# Patient Record
Sex: FEMALE | Race: ASIAN | NOT HISPANIC OR LATINO | Employment: OTHER | URBAN - METROPOLITAN AREA
[De-identification: names, ages, dates, MRNs, and addresses within clinical notes are randomized per-mention and may not be internally consistent; named-entity substitution may affect disease eponyms.]

---

## 2017-03-03 ENCOUNTER — HISTORICAL (OUTPATIENT)
Dept: ADMINISTRATIVE | Facility: HOSPITAL | Age: 82
End: 2017-03-03

## 2017-03-13 ENCOUNTER — HISTORICAL (OUTPATIENT)
Dept: ADMINISTRATIVE | Facility: HOSPITAL | Age: 82
End: 2017-03-13

## 2017-06-08 ENCOUNTER — HISTORICAL (OUTPATIENT)
Dept: ADMINISTRATIVE | Facility: HOSPITAL | Age: 82
End: 2017-06-08

## 2017-09-11 ENCOUNTER — HISTORICAL (OUTPATIENT)
Dept: ADMINISTRATIVE | Facility: HOSPITAL | Age: 82
End: 2017-09-11

## 2017-12-07 ENCOUNTER — HISTORICAL (OUTPATIENT)
Dept: ADMINISTRATIVE | Facility: HOSPITAL | Age: 82
End: 2017-12-07

## 2018-01-30 ENCOUNTER — HISTORICAL (OUTPATIENT)
Dept: ADMINISTRATIVE | Facility: HOSPITAL | Age: 83
End: 2018-01-30

## 2018-03-08 ENCOUNTER — HISTORICAL (OUTPATIENT)
Dept: ADMINISTRATIVE | Facility: HOSPITAL | Age: 83
End: 2018-03-08

## 2018-06-07 ENCOUNTER — HISTORICAL (OUTPATIENT)
Dept: ADMINISTRATIVE | Facility: HOSPITAL | Age: 83
End: 2018-06-07

## 2018-09-06 ENCOUNTER — HISTORICAL (OUTPATIENT)
Dept: ADMINISTRATIVE | Facility: HOSPITAL | Age: 83
End: 2018-09-06

## 2018-12-06 ENCOUNTER — HISTORICAL (OUTPATIENT)
Dept: ADMINISTRATIVE | Facility: HOSPITAL | Age: 83
End: 2018-12-06

## 2019-03-11 ENCOUNTER — HISTORICAL (OUTPATIENT)
Dept: ADMINISTRATIVE | Facility: HOSPITAL | Age: 84
End: 2019-03-11

## 2019-06-06 ENCOUNTER — HISTORICAL (OUTPATIENT)
Dept: ADMINISTRATIVE | Facility: HOSPITAL | Age: 84
End: 2019-06-06

## 2019-09-09 ENCOUNTER — HISTORICAL (OUTPATIENT)
Dept: ADMINISTRATIVE | Facility: HOSPITAL | Age: 84
End: 2019-09-09

## 2019-12-09 ENCOUNTER — HISTORICAL (OUTPATIENT)
Dept: ADMINISTRATIVE | Facility: HOSPITAL | Age: 84
End: 2019-12-09

## 2019-12-09 LAB
ALBUMIN SERPL-MCNC: 3.2 GM/DL (ref 3.4–5)
ALBUMIN/GLOB SERPL: 0.8 {RATIO}
ALP SERPL-CCNC: 69 UNIT/L (ref 45–117)
ALT SERPL-CCNC: 11 UNIT/L (ref 13–56)
AST SERPL-CCNC: 14 UNIT/L (ref 15–37)
BILIRUB SERPL-MCNC: 0.4 MG/DL (ref 0.2–1)
BILIRUBIN DIRECT+TOT PNL SERPL-MCNC: <0.1 MG/DL (ref 0–0.2)
BILIRUBIN DIRECT+TOT PNL SERPL-MCNC: >0.3 MG/DL (ref 0–1)
BUN SERPL-MCNC: 27 MG/DL (ref 7–18)
CALCIUM SERPL-MCNC: 8.1 MG/DL (ref 8.5–10.1)
CHLORIDE SERPL-SCNC: 108 MMOL/L (ref 98–107)
CO2 SERPL-SCNC: 25 MMOL/L (ref 21–32)
CREAT SERPL-MCNC: 0.77 MG/DL (ref 0.55–1.02)
EST. AVERAGE GLUCOSE BLD GHB EST-MCNC: 143 MG/DL
GLOBULIN SER-MCNC: 4 GM/DL (ref 2–4)
GLUCOSE SERPL-MCNC: 99 MG/DL (ref 74–106)
HBA1C MFR BLD: 6.6 % (ref 4.2–6.3)
POTASSIUM SERPL-SCNC: 4.1 MMOL/L (ref 3.5–5.1)
PROT SERPL-MCNC: 6.9 GM/DL (ref 6.4–8.2)
SODIUM SERPL-SCNC: 139 MMOL/L (ref 136–145)
TSH SERPL-ACNC: 3.68 MIU/ML (ref 0.36–3.74)

## 2020-02-29 ENCOUNTER — HISTORICAL (OUTPATIENT)
Dept: ADMINISTRATIVE | Facility: HOSPITAL | Age: 85
End: 2020-02-29

## 2020-03-09 ENCOUNTER — HISTORICAL (OUTPATIENT)
Dept: ADMINISTRATIVE | Facility: HOSPITAL | Age: 85
End: 2020-03-09

## 2020-04-13 ENCOUNTER — HISTORICAL (OUTPATIENT)
Dept: ADMINISTRATIVE | Facility: HOSPITAL | Age: 85
End: 2020-04-13

## 2020-12-14 ENCOUNTER — HISTORICAL (OUTPATIENT)
Dept: ADMINISTRATIVE | Facility: HOSPITAL | Age: 85
End: 2020-12-14

## 2020-12-14 LAB
ALBUMIN SERPL-MCNC: 3.3 GM/DL (ref 3.4–4.8)
ALBUMIN/GLOB SERPL: 1.1 RATIO (ref 1.1–2)
ALP SERPL-CCNC: 63 UNIT/L (ref 40–150)
ALT SERPL-CCNC: 12 UNIT/L (ref 0–55)
AST SERPL-CCNC: 16 UNIT/L (ref 5–34)
BILIRUB SERPL-MCNC: 0.4 MG/DL (ref 0.2–1.2)
BILIRUBIN DIRECT+TOT PNL SERPL-MCNC: 0.1 MG/DL (ref 0–0.5)
BILIRUBIN DIRECT+TOT PNL SERPL-MCNC: 0.3 MG/DL (ref 0–0.8)
BUN SERPL-MCNC: 20.2 MG/DL (ref 9.8–20.1)
CALCIUM SERPL-MCNC: 8.4 MG/DL (ref 8.4–10.2)
CHLORIDE SERPL-SCNC: 108 MMOL/L (ref 98–107)
CO2 SERPL-SCNC: 26 MMOL/L (ref 23–31)
CREAT SERPL-MCNC: 0.78 MG/DL (ref 0.57–1.11)
EST. AVERAGE GLUCOSE BLD GHB EST-MCNC: 154.2 MG/DL
GLOBULIN SER-MCNC: 3.1 GM/DL (ref 2.4–3.5)
GLUCOSE SERPL-MCNC: 83 MG/DL (ref 82–115)
HBA1C MFR BLD: 7 %
POTASSIUM SERPL-SCNC: 4 MMOL/L (ref 3.5–5.1)
PROT SERPL-MCNC: 6.4 GM/DL (ref 5.8–7.6)
SODIUM SERPL-SCNC: 141 MMOL/L (ref 136–145)
TSH SERPL-ACNC: 2.76 UIU/ML (ref 0.35–4.94)

## 2021-03-01 ENCOUNTER — HISTORICAL (OUTPATIENT)
Dept: ADMINISTRATIVE | Facility: HOSPITAL | Age: 86
End: 2021-03-01

## 2021-03-01 LAB
EST. AVERAGE GLUCOSE BLD GHB EST-MCNC: 145.6 MG/DL
HBA1C MFR BLD: 6.7 %

## 2021-04-20 ENCOUNTER — HISTORICAL (OUTPATIENT)
Dept: ADMINISTRATIVE | Facility: HOSPITAL | Age: 86
End: 2021-04-20

## 2021-04-20 LAB
APPEARANCE, UA: ABNORMAL
BILIRUB UR QL STRIP: NEGATIVE
COLOR UR: ABNORMAL
GLUCOSE (UA): ABNORMAL
HGB UR QL STRIP: NEGATIVE
KETONES UR QL STRIP: NEGATIVE
LEUKOCYTE ESTERASE UR QL STRIP: NEGATIVE
NITRITE UR QL STRIP: NEGATIVE
PH UR STRIP: 5 [PH] (ref 5–7)
PROT UR QL STRIP: NEGATIVE
SP GR UR STRIP: >=1.03 (ref 1–1.03)
UROBILINOGEN UR STRIP-ACNC: NEGATIVE

## 2021-06-07 ENCOUNTER — HISTORICAL (OUTPATIENT)
Dept: ADMINISTRATIVE | Facility: HOSPITAL | Age: 86
End: 2021-06-07

## 2021-06-07 LAB
ALBUMIN SERPL-MCNC: 3.1 GM/DL (ref 3.4–4.8)
ALBUMIN/GLOB SERPL: 1.1 RATIO (ref 1.1–2)
ALP SERPL-CCNC: 58 UNIT/L (ref 40–150)
ALT SERPL-CCNC: 11 UNIT/L (ref 0–55)
AST SERPL-CCNC: 15 UNIT/L (ref 5–34)
BILIRUB SERPL-MCNC: 0.5 MG/DL (ref 0.2–1.2)
BILIRUBIN DIRECT+TOT PNL SERPL-MCNC: 0.2 MG/DL (ref 0–0.5)
BILIRUBIN DIRECT+TOT PNL SERPL-MCNC: 0.3 MG/DL (ref 0–0.8)
BUN SERPL-MCNC: 21.4 MG/DL (ref 9.8–20.1)
CALCIUM SERPL-MCNC: 8.1 MG/DL (ref 8.4–10.2)
CHLORIDE SERPL-SCNC: 109 MMOL/L (ref 98–107)
CO2 SERPL-SCNC: 23 MMOL/L (ref 23–31)
CREAT SERPL-MCNC: 0.71 MG/DL (ref 0.57–1.11)
EST. AVERAGE GLUCOSE BLD GHB EST-MCNC: 148.5 MG/DL
GLOBULIN SER-MCNC: 2.9 GM/DL (ref 2.4–3.5)
GLUCOSE SERPL-MCNC: 71 MG/DL (ref 82–115)
HBA1C MFR BLD: 6.8 %
POTASSIUM SERPL-SCNC: 3.6 MMOL/L (ref 3.5–5.1)
PROT SERPL-MCNC: 6 GM/DL (ref 5.8–7.6)
SODIUM SERPL-SCNC: 142 MMOL/L (ref 136–145)

## 2021-09-07 ENCOUNTER — HISTORICAL (OUTPATIENT)
Dept: ADMINISTRATIVE | Facility: HOSPITAL | Age: 86
End: 2021-09-07

## 2021-09-07 LAB
EST. AVERAGE GLUCOSE BLD GHB EST-MCNC: 159.9 MG/DL
HBA1C MFR BLD: 7.2 %

## 2022-03-07 ENCOUNTER — HISTORICAL (OUTPATIENT)
Dept: ADMINISTRATIVE | Facility: HOSPITAL | Age: 87
End: 2022-03-07

## 2022-03-07 LAB
EST. AVERAGE GLUCOSE BLD GHB EST-MCNC: 134.1 MG/DL
HBA1C MFR BLD: 6.3 %

## 2022-04-07 ENCOUNTER — HISTORICAL (OUTPATIENT)
Dept: ADMINISTRATIVE | Facility: HOSPITAL | Age: 87
End: 2022-04-07

## 2022-04-07 LAB
APPEARANCE, UA: NORMAL
BACTERIA SPEC CULT: NORMAL
BILIRUB UR QL STRIP.AUTO: NEGATIVE
BILIRUB UR QL STRIP: NEGATIVE
COLOR UR: YELLOW
DO MICRO?: YES
GLUCOSE (UA): NEGATIVE
GLUCOSE UR QL STRIP.AUTO: NEGATIVE
HGB UR QL STRIP: NEGATIVE
KETONES UR QL STRIP.AUTO: NEGATIVE
KETONES UR QL STRIP: NEGATIVE
LEUKOCYTE ESTERASE UR QL STRIP.AUTO: NEGATIVE
LEUKOCYTE ESTERASE UR QL STRIP: NEGATIVE
NITRITE UR QL STRIP: NEGATIVE
PH UR STRIP: 6 [PH] (ref 5–7)
PROT UR QL STRIP.AUTO: NORMAL
PROT UR QL STRIP: NORMAL
RBC #/AREA URNS HPF: 0 /[HPF] (ref 2–5)
RBC UR QL AUTO: NEGATIVE
SP GR UR STRIP: >=1.03 (ref 1–1.03)
SQUAMOUS EPITHELIAL, UA: NORMAL
UROBILINOGEN UR STRIP-ACNC: 0.2
UROBILINOGEN UR STRIP-ACNC: NEGATIVE
WBC #/AREA URNS HPF: NORMAL /[HPF] (ref 2–5)

## 2022-06-06 ENCOUNTER — LAB REQUISITION (OUTPATIENT)
Dept: LAB | Facility: HOSPITAL | Age: 87
End: 2022-06-06
Payer: MEDICARE

## 2022-06-06 DIAGNOSIS — I10 ESSENTIAL (PRIMARY) HYPERTENSION: ICD-10-CM

## 2022-06-06 DIAGNOSIS — E11.9 TYPE 2 DIABETES MELLITUS WITHOUT COMPLICATIONS: ICD-10-CM

## 2022-06-06 LAB
ALBUMIN SERPL-MCNC: 3.7 GM/DL (ref 3.4–4.8)
ALBUMIN/GLOB SERPL: 1.2 RATIO (ref 1.1–2)
ALP SERPL-CCNC: 51 UNIT/L (ref 40–150)
ALT SERPL-CCNC: 13 UNIT/L (ref 0–55)
AST SERPL-CCNC: 17 UNIT/L (ref 5–34)
BILIRUBIN DIRECT+TOT PNL SERPL-MCNC: 0.5 MG/DL
BUN SERPL-MCNC: 26.8 MG/DL (ref 9.8–20.1)
CALCIUM SERPL-MCNC: 8.8 MG/DL (ref 8.4–10.2)
CHLORIDE SERPL-SCNC: 109 MMOL/L (ref 98–111)
CO2 SERPL-SCNC: 24 MMOL/L (ref 23–31)
CREAT SERPL-MCNC: 0.68 MG/DL (ref 0.55–1.02)
EST. AVERAGE GLUCOSE BLD GHB EST-MCNC: 134.1 MG/DL
GLOBULIN SER-MCNC: 3.1 GM/DL (ref 2.4–3.5)
GLUCOSE SERPL-MCNC: 13 MG/DL (ref 75–121)
HBA1C MFR BLD: 6.3 %
POTASSIUM SERPL-SCNC: 3.7 MMOL/L (ref 3.5–5.1)
PROT SERPL-MCNC: 6.8 GM/DL (ref 5.8–7.6)
SODIUM SERPL-SCNC: 145 MMOL/L (ref 132–146)

## 2022-06-06 PROCEDURE — 84075 ASSAY ALKALINE PHOSPHATASE: CPT | Performed by: FAMILY MEDICINE

## 2022-06-06 PROCEDURE — 80053 COMPREHEN METABOLIC PANEL: CPT | Performed by: FAMILY MEDICINE

## 2022-06-06 PROCEDURE — 83036 HEMOGLOBIN GLYCOSYLATED A1C: CPT | Performed by: FAMILY MEDICINE

## 2022-06-14 ENCOUNTER — LAB REQUISITION (OUTPATIENT)
Dept: LAB | Facility: HOSPITAL | Age: 87
End: 2022-06-14
Payer: MEDICARE

## 2022-06-14 DIAGNOSIS — N39.0 URINARY TRACT INFECTION, SITE NOT SPECIFIED: ICD-10-CM

## 2022-06-14 LAB
APPEARANCE UR: ABNORMAL
BILIRUB UR QL STRIP.AUTO: NEGATIVE MG/DL
COLOR UR AUTO: YELLOW
GLUCOSE UR QL STRIP.AUTO: 250 MG/DL
KETONES UR QL STRIP.AUTO: ABNORMAL MG/DL
LEUKOCYTE ESTERASE UR QL STRIP.AUTO: NEGATIVE UNIT/L
NITRITE UR QL STRIP.AUTO: NEGATIVE
PH UR STRIP.AUTO: 5 [PH]
PROT UR QL STRIP.AUTO: NEGATIVE MG/DL
RBC UR QL AUTO: NEGATIVE UNIT/L
SP GR UR STRIP.AUTO: >=1.03
UROBILINOGEN UR STRIP-ACNC: 0.2 MG/DL

## 2022-06-14 PROCEDURE — 87088 URINE BACTERIA CULTURE: CPT | Performed by: FAMILY MEDICINE

## 2022-06-14 PROCEDURE — 81003 URINALYSIS AUTO W/O SCOPE: CPT | Performed by: FAMILY MEDICINE

## 2022-06-16 LAB — BACTERIA UR CULT: NO GROWTH

## 2022-09-01 ENCOUNTER — LAB REQUISITION (OUTPATIENT)
Dept: LAB | Facility: HOSPITAL | Age: 87
End: 2022-09-01
Payer: MEDICARE

## 2022-09-01 DIAGNOSIS — E11.9 TYPE 2 DIABETES MELLITUS WITHOUT COMPLICATIONS: ICD-10-CM

## 2022-09-01 LAB
EST. AVERAGE GLUCOSE BLD GHB EST-MCNC: 151.3 MG/DL
HBA1C MFR BLD: 6.9 %

## 2022-09-01 PROCEDURE — 83036 HEMOGLOBIN GLYCOSYLATED A1C: CPT | Performed by: FAMILY MEDICINE

## 2022-12-01 ENCOUNTER — LAB REQUISITION (OUTPATIENT)
Dept: LAB | Facility: HOSPITAL | Age: 87
End: 2022-12-01
Payer: MEDICARE

## 2022-12-01 DIAGNOSIS — E11.9 TYPE 2 DIABETES MELLITUS WITHOUT COMPLICATIONS: ICD-10-CM

## 2022-12-01 DIAGNOSIS — E78.5 HYPERLIPIDEMIA, UNSPECIFIED: ICD-10-CM

## 2022-12-01 LAB
ALBUMIN SERPL-MCNC: 3.3 GM/DL (ref 3.4–4.8)
ALBUMIN/GLOB SERPL: 1.1 RATIO (ref 1.1–2)
ALP SERPL-CCNC: 64 UNIT/L (ref 40–150)
ALT SERPL-CCNC: 10 UNIT/L (ref 0–55)
AST SERPL-CCNC: 15 UNIT/L (ref 5–34)
BASOPHILS # BLD AUTO: 0.03 X10(3)/MCL (ref 0–0.2)
BASOPHILS NFR BLD AUTO: 0.6 %
BILIRUBIN DIRECT+TOT PNL SERPL-MCNC: 0.4 MG/DL
BUN SERPL-MCNC: 24.4 MG/DL (ref 9.8–20.1)
CALCIUM SERPL-MCNC: 8.7 MG/DL (ref 8.4–10.2)
CHLORIDE SERPL-SCNC: 109 MMOL/L (ref 98–111)
CO2 SERPL-SCNC: 28 MMOL/L (ref 23–31)
CREAT SERPL-MCNC: 0.71 MG/DL (ref 0.55–1.02)
EOSINOPHIL # BLD AUTO: 0.11 X10(3)/MCL (ref 0–0.9)
EOSINOPHIL NFR BLD AUTO: 2 %
ERYTHROCYTE [DISTWIDTH] IN BLOOD BY AUTOMATED COUNT: 12.7 % (ref 11.5–17)
EST. AVERAGE GLUCOSE BLD GHB EST-MCNC: 128.4 MG/DL
GFR SERPLBLD CREATININE-BSD FMLA CKD-EPI: >60 MLS/MIN/1.73/M2
GLOBULIN SER-MCNC: 3 GM/DL (ref 2.4–3.5)
GLUCOSE SERPL-MCNC: 102 MG/DL (ref 75–121)
HBA1C MFR BLD: 6.1 %
HCT VFR BLD AUTO: 39.7 % (ref 37–47)
HGB BLD-MCNC: 12.5 GM/DL (ref 12–16)
IMM GRANULOCYTES # BLD AUTO: 0.01 X10(3)/MCL (ref 0–0.04)
IMM GRANULOCYTES NFR BLD AUTO: 0.2 %
LYMPHOCYTES # BLD AUTO: 2.46 X10(3)/MCL (ref 0.6–4.6)
LYMPHOCYTES NFR BLD AUTO: 45.5 %
MCH RBC QN AUTO: 31.4 PG (ref 27–31)
MCHC RBC AUTO-ENTMCNC: 31.5 MG/DL (ref 33–36)
MCV RBC AUTO: 99.7 FL (ref 80–94)
MONOCYTES # BLD AUTO: 0.51 X10(3)/MCL (ref 0.1–1.3)
MONOCYTES NFR BLD AUTO: 9.4 %
NEUTROPHILS # BLD AUTO: 2.3 X10(3)/MCL (ref 2.1–9.2)
NEUTROPHILS NFR BLD AUTO: 42.3 %
NRBC BLD AUTO-RTO: 0 %
PLATELET # BLD AUTO: 215 X10(3)/MCL (ref 130–400)
PMV BLD AUTO: 10.2 FL (ref 7.4–10.4)
POTASSIUM SERPL-SCNC: 4.2 MMOL/L (ref 3.5–5.1)
PROT SERPL-MCNC: 6.3 GM/DL (ref 5.8–7.6)
RBC # BLD AUTO: 3.98 X10(6)/MCL (ref 4.2–5.4)
SODIUM SERPL-SCNC: 142 MMOL/L (ref 132–146)
TSH SERPL-ACNC: 5.02 UIU/ML (ref 0.35–4.94)
WBC # SPEC AUTO: 5.4 X10(3)/MCL (ref 4.5–11.5)

## 2022-12-01 PROCEDURE — 83036 HEMOGLOBIN GLYCOSYLATED A1C: CPT | Performed by: FAMILY MEDICINE

## 2022-12-01 PROCEDURE — 84443 ASSAY THYROID STIM HORMONE: CPT | Performed by: FAMILY MEDICINE

## 2022-12-01 PROCEDURE — 80053 COMPREHEN METABOLIC PANEL: CPT | Performed by: FAMILY MEDICINE

## 2022-12-01 PROCEDURE — 85025 COMPLETE CBC W/AUTO DIFF WBC: CPT | Performed by: FAMILY MEDICINE

## 2022-12-22 ENCOUNTER — LAB REQUISITION (OUTPATIENT)
Dept: LAB | Facility: HOSPITAL | Age: 87
End: 2022-12-22
Payer: MEDICARE

## 2022-12-22 DIAGNOSIS — N39.0 URINARY TRACT INFECTION, SITE NOT SPECIFIED: ICD-10-CM

## 2022-12-22 DIAGNOSIS — I10 ESSENTIAL (PRIMARY) HYPERTENSION: ICD-10-CM

## 2022-12-22 DIAGNOSIS — R77.0 ABNORMALITY OF ALBUMIN: ICD-10-CM

## 2022-12-22 DIAGNOSIS — R94.6 ABNORMAL RESULTS OF THYROID FUNCTION STUDIES: ICD-10-CM

## 2022-12-22 LAB
ALBUMIN SERPL-MCNC: 3.3 G/DL (ref 3.4–4.8)
ALBUMIN/GLOB SERPL: 0.9 RATIO (ref 1.1–2)
ALP SERPL-CCNC: 59 UNIT/L (ref 40–150)
ALT SERPL-CCNC: 6 UNIT/L (ref 0–55)
AMORPH PHOS CRY URNS QL MICRO: ABNORMAL /HPF
APPEARANCE UR: ABNORMAL
AST SERPL-CCNC: 14 UNIT/L (ref 5–34)
BACTERIA #/AREA URNS AUTO: ABNORMAL /HPF
BASOPHILS # BLD AUTO: 0.04 X10(3)/MCL (ref 0–0.2)
BASOPHILS NFR BLD AUTO: 0.5 %
BILIRUB UR QL STRIP.AUTO: NEGATIVE MG/DL
BILIRUBIN DIRECT+TOT PNL SERPL-MCNC: 0.6 MG/DL
BUN SERPL-MCNC: 45.1 MG/DL (ref 9.8–20.1)
CALCIUM SERPL-MCNC: 9.1 MG/DL (ref 8.4–10.2)
CHLORIDE SERPL-SCNC: 109 MMOL/L (ref 98–111)
CO2 SERPL-SCNC: 27 MMOL/L (ref 23–31)
COLOR UR AUTO: YELLOW
CREAT SERPL-MCNC: 0.8 MG/DL (ref 0.55–1.02)
EOSINOPHIL # BLD AUTO: 0.1 X10(3)/MCL (ref 0–0.9)
EOSINOPHIL NFR BLD AUTO: 1.3 %
ERYTHROCYTE [DISTWIDTH] IN BLOOD BY AUTOMATED COUNT: 12.7 % (ref 11–14.5)
GFR SERPLBLD CREATININE-BSD FMLA CKD-EPI: >60 MLS/MIN/1.73/M2
GLOBULIN SER-MCNC: 3.8 GM/DL (ref 2.4–3.5)
GLUCOSE SERPL-MCNC: 146 MG/DL (ref 75–121)
GLUCOSE UR QL STRIP.AUTO: 100 MG/DL
HCT VFR BLD AUTO: 42.7 % (ref 37–47)
HGB BLD-MCNC: 13.2 GM/DL (ref 12–16)
IMM GRANULOCYTES # BLD AUTO: 0.03 X10(3)/MCL (ref 0–0.04)
IMM GRANULOCYTES NFR BLD AUTO: 0.4 %
KETONES UR QL STRIP.AUTO: ABNORMAL MG/DL
LEUKOCYTE ESTERASE UR QL STRIP.AUTO: ABNORMAL UNIT/L
LYMPHOCYTES # BLD AUTO: 2.82 X10(3)/MCL (ref 0.6–4.6)
LYMPHOCYTES NFR BLD AUTO: 36.9 %
MCH RBC QN AUTO: 31 PG
MCHC RBC AUTO-ENTMCNC: 30.9 MG/DL (ref 33–36)
MCV RBC AUTO: 100.2 FL (ref 80–94)
MONOCYTES # BLD AUTO: 0.43 X10(3)/MCL (ref 0.1–1.3)
MONOCYTES NFR BLD AUTO: 5.6 %
NEUTROPHILS # BLD AUTO: 4.22 X10(3)/MCL (ref 2.1–9.2)
NEUTROPHILS NFR BLD AUTO: 55.3 %
NITRITE UR QL STRIP.AUTO: NEGATIVE
NRBC BLD AUTO-RTO: 0 % (ref 0–1)
PH UR STRIP.AUTO: 8 [PH]
PLATELET # BLD AUTO: 300 X10(3)/MCL (ref 140–371)
PMV BLD AUTO: 9.6 FL (ref 9.4–12.4)
POTASSIUM SERPL-SCNC: 4 MMOL/L (ref 3.5–5.1)
PREALB SERPL-MCNC: 17 MG/DL (ref 14–37)
PROT SERPL-MCNC: 7.1 GM/DL (ref 5.8–7.6)
PROT UR QL STRIP.AUTO: >=300 MG/DL
RBC # BLD AUTO: 4.26 X10(6)/MCL (ref 4.2–5.4)
RBC #/AREA URNS AUTO: ABNORMAL /HPF
RBC UR QL AUTO: ABNORMAL UNIT/L
SODIUM SERPL-SCNC: 146 MMOL/L (ref 132–146)
SP GR UR STRIP.AUTO: 1.02
SQUAMOUS #/AREA URNS AUTO: ABNORMAL /HPF
T4 FREE SERPL-MCNC: 0.91 NG/DL (ref 0.7–1.48)
TRI-PHOS CRY URNS QL MICRO: ABNORMAL /HPF
TSH SERPL-ACNC: 2.96 UIU/ML (ref 0.35–4.94)
UROBILINOGEN UR STRIP-ACNC: 1 MG/DL
WBC # SPEC AUTO: 7.6 X10(3)/MCL (ref 4.5–11.5)
WBC #/AREA URNS AUTO: >100 /HPF

## 2022-12-22 PROCEDURE — 81003 URINALYSIS AUTO W/O SCOPE: CPT | Performed by: FAMILY MEDICINE

## 2022-12-22 PROCEDURE — 80053 COMPREHEN METABOLIC PANEL: CPT | Performed by: FAMILY MEDICINE

## 2022-12-22 PROCEDURE — 84134 ASSAY OF PREALBUMIN: CPT | Performed by: FAMILY MEDICINE

## 2022-12-22 PROCEDURE — 84443 ASSAY THYROID STIM HORMONE: CPT | Performed by: FAMILY MEDICINE

## 2022-12-22 PROCEDURE — 84439 ASSAY OF FREE THYROXINE: CPT | Performed by: FAMILY MEDICINE

## 2022-12-22 PROCEDURE — 87186 SC STD MICRODIL/AGAR DIL: CPT | Performed by: FAMILY MEDICINE

## 2022-12-22 PROCEDURE — 87088 URINE BACTERIA CULTURE: CPT | Performed by: FAMILY MEDICINE

## 2022-12-22 PROCEDURE — 85025 COMPLETE CBC W/AUTO DIFF WBC: CPT | Performed by: FAMILY MEDICINE

## 2022-12-24 LAB — BACTERIA UR CULT: ABNORMAL

## 2022-12-27 ENCOUNTER — HOSPITAL ENCOUNTER (INPATIENT)
Facility: HOSPITAL | Age: 87
LOS: 8 days | DRG: 689 | End: 2023-01-04
Attending: EMERGENCY MEDICINE | Admitting: INTERNAL MEDICINE
Payer: MEDICARE

## 2022-12-27 DIAGNOSIS — R07.9 CHEST PAIN: ICD-10-CM

## 2022-12-27 DIAGNOSIS — R41.82 ALTERED MENTAL STATE: ICD-10-CM

## 2022-12-27 DIAGNOSIS — G93.40 ACUTE ENCEPHALOPATHY: Primary | ICD-10-CM

## 2022-12-27 DIAGNOSIS — Z78.9 FAILURE OF OUTPATIENT TREATMENT: ICD-10-CM

## 2022-12-27 DIAGNOSIS — N39.0 ACUTE UTI: ICD-10-CM

## 2022-12-27 LAB
ALBUMIN SERPL-MCNC: 3.4 G/DL (ref 3.4–4.8)
ALBUMIN/GLOB SERPL: 0.7 RATIO (ref 1.1–2)
ALP SERPL-CCNC: 81 UNIT/L (ref 40–150)
ALT SERPL-CCNC: <5 UNIT/L (ref 0–55)
APPEARANCE UR: ABNORMAL
AST SERPL-CCNC: 19 UNIT/L (ref 5–34)
BACTERIA #/AREA URNS AUTO: ABNORMAL /HPF
BASOPHILS # BLD AUTO: 0.03 X10(3)/MCL (ref 0–0.2)
BASOPHILS NFR BLD AUTO: 0.4 %
BILIRUB UR QL STRIP.AUTO: NEGATIVE MG/DL
BILIRUBIN DIRECT+TOT PNL SERPL-MCNC: 0.5 MG/DL
BUN SERPL-MCNC: 33.2 MG/DL (ref 9.8–20.1)
CALCIUM SERPL-MCNC: 9.5 MG/DL (ref 8.4–10.2)
CHLORIDE SERPL-SCNC: 115 MMOL/L (ref 98–111)
CO2 SERPL-SCNC: 25 MMOL/L (ref 23–31)
COLOR UR AUTO: YELLOW
CREAT SERPL-MCNC: 0.8 MG/DL (ref 0.55–1.02)
EOSINOPHIL # BLD AUTO: 0.12 X10(3)/MCL (ref 0–0.9)
EOSINOPHIL NFR BLD AUTO: 1.5 %
ERYTHROCYTE [DISTWIDTH] IN BLOOD BY AUTOMATED COUNT: 12.8 % (ref 11–14.5)
FLUAV AG UPPER RESP QL IA.RAPID: NOT DETECTED
FLUBV AG UPPER RESP QL IA.RAPID: NOT DETECTED
GFR SERPLBLD CREATININE-BSD FMLA CKD-EPI: >60 MLS/MIN/1.73/M2
GLOBULIN SER-MCNC: 4.7 GM/DL (ref 2.4–3.5)
GLUCOSE SERPL-MCNC: 129 MG/DL (ref 75–121)
GLUCOSE UR QL STRIP.AUTO: ABNORMAL MG/DL
HCT VFR BLD AUTO: 43.7 % (ref 37–47)
HGB BLD-MCNC: 13.6 GM/DL (ref 12–16)
IMM GRANULOCYTES # BLD AUTO: 0.02 X10(3)/MCL (ref 0–0.04)
IMM GRANULOCYTES NFR BLD AUTO: 0.3 %
KETONES UR QL STRIP.AUTO: NEGATIVE MG/DL
LACTATE SERPL-SCNC: 3.3 MMOL/L (ref 0.5–2.2)
LEUKOCYTE ESTERASE UR QL STRIP.AUTO: ABNORMAL UNIT/L
LYMPHOCYTES # BLD AUTO: 2.75 X10(3)/MCL (ref 0.6–4.6)
LYMPHOCYTES NFR BLD AUTO: 34.5 %
MAGNESIUM SERPL-MCNC: 2.6 MG/DL (ref 1.6–2.6)
MCH RBC QN AUTO: 30.9 PG
MCHC RBC AUTO-ENTMCNC: 31.1 MG/DL (ref 33–36)
MCV RBC AUTO: 99.3 FL (ref 80–94)
MONOCYTES # BLD AUTO: 0.52 X10(3)/MCL (ref 0.1–1.3)
MONOCYTES NFR BLD AUTO: 6.5 %
NEUTROPHILS # BLD AUTO: 4.54 X10(3)/MCL (ref 2.1–9.2)
NEUTROPHILS NFR BLD AUTO: 56.8 %
NITRITE UR QL STRIP.AUTO: NEGATIVE
NRBC BLD AUTO-RTO: 0 % (ref 0–1)
PH UR STRIP.AUTO: 6 [PH]
PLATELET # BLD AUTO: 274 X10(3)/MCL (ref 140–371)
PMV BLD AUTO: 9.6 FL (ref 9.4–12.4)
POTASSIUM SERPL-SCNC: 4.3 MMOL/L (ref 3.5–5.1)
PROT SERPL-MCNC: 8.1 GM/DL (ref 5.8–7.6)
PROT UR QL STRIP.AUTO: ABNORMAL MG/DL
RBC # BLD AUTO: 4.4 X10(6)/MCL (ref 4.2–5.4)
RBC #/AREA URNS AUTO: ABNORMAL /HPF
RBC UR QL AUTO: ABNORMAL UNIT/L
SARS-COV-2 RNA RESP QL NAA+PROBE: NOT DETECTED
SODIUM SERPL-SCNC: 149 MMOL/L (ref 132–146)
SP GR UR STRIP.AUTO: 1.03 (ref 1–1.03)
SQUAMOUS #/AREA URNS AUTO: ABNORMAL /HPF
UROBILINOGEN UR STRIP-ACNC: 1 MG/DL
WBC # SPEC AUTO: 8 X10(3)/MCL (ref 4.5–11.5)
WBC #/AREA URNS AUTO: >100 /HPF

## 2022-12-27 PROCEDURE — 96361 HYDRATE IV INFUSION ADD-ON: CPT

## 2022-12-27 PROCEDURE — 80053 COMPREHEN METABOLIC PANEL: CPT | Performed by: NURSE PRACTITIONER

## 2022-12-27 PROCEDURE — 87040 BLOOD CULTURE FOR BACTERIA: CPT | Performed by: NURSE PRACTITIONER

## 2022-12-27 PROCEDURE — 25000003 PHARM REV CODE 250: Performed by: EMERGENCY MEDICINE

## 2022-12-27 PROCEDURE — 11000001 HC ACUTE MED/SURG PRIVATE ROOM

## 2022-12-27 PROCEDURE — 0240U COVID/FLU A&B PCR: CPT | Performed by: NURSE PRACTITIONER

## 2022-12-27 PROCEDURE — 93010 EKG 12-LEAD: ICD-10-PCS | Mod: ,,, | Performed by: INTERNAL MEDICINE

## 2022-12-27 PROCEDURE — 96374 THER/PROPH/DIAG INJ IV PUSH: CPT

## 2022-12-27 PROCEDURE — 81003 URINALYSIS AUTO W/O SCOPE: CPT | Performed by: NURSE PRACTITIONER

## 2022-12-27 PROCEDURE — 87077 CULTURE AEROBIC IDENTIFY: CPT | Performed by: NURSE PRACTITIONER

## 2022-12-27 PROCEDURE — 63600175 PHARM REV CODE 636 W HCPCS: Performed by: EMERGENCY MEDICINE

## 2022-12-27 PROCEDURE — 83735 ASSAY OF MAGNESIUM: CPT | Performed by: NURSE PRACTITIONER

## 2022-12-27 PROCEDURE — 83605 ASSAY OF LACTIC ACID: CPT | Performed by: NURSE PRACTITIONER

## 2022-12-27 PROCEDURE — 85025 COMPLETE CBC W/AUTO DIFF WBC: CPT | Performed by: NURSE PRACTITIONER

## 2022-12-27 PROCEDURE — 93005 ELECTROCARDIOGRAM TRACING: CPT

## 2022-12-27 PROCEDURE — 93010 ELECTROCARDIOGRAM REPORT: CPT | Mod: ,,, | Performed by: INTERNAL MEDICINE

## 2022-12-27 PROCEDURE — 99285 EMERGENCY DEPT VISIT HI MDM: CPT | Mod: 25

## 2022-12-27 RX ADMIN — SODIUM CHLORIDE 1000 ML: 9 INJECTION, SOLUTION INTRAVENOUS at 09:12

## 2022-12-27 RX ADMIN — PIPERACILLIN SODIUM,TAZOBACTAM SODIUM 4.5 G: 4; .5 INJECTION, POWDER, FOR SOLUTION INTRAVENOUS at 11:12

## 2022-12-27 NOTE — Clinical Note
Diagnosis: Acute encephalopathy [851175]   Admitting Provider:: ANDREAS ERVIN [[id number]]   Future Attending Provider: ANDREAS ERVIN [[id number]]   Reason for IP Medical Treatment  (Clinical interventions that can only be accomplished in the IP setting? ) :: see diagnosis   Estimated Length of Stay:: 2 midnights   I certify that Inpatient services for greater than or equal to 2 midnights are medically necessary:: Yes   Plans for Post-Acute care--if anticipated (pick the single best option):: A. No post acute care anticipated at this time

## 2022-12-28 PROBLEM — G93.40 ACUTE ENCEPHALOPATHY: Status: ACTIVE | Noted: 2022-12-28

## 2022-12-28 PROBLEM — N39.0 UTI (URINARY TRACT INFECTION): Status: ACTIVE | Noted: 2022-12-28

## 2022-12-28 LAB
ANION GAP SERPL CALC-SCNC: 10 MEQ/L
BUN SERPL-MCNC: 30.1 MG/DL (ref 9.8–20.1)
CALCIUM SERPL-MCNC: 8.2 MG/DL (ref 8.4–10.2)
CHLORIDE SERPL-SCNC: 115 MMOL/L (ref 98–111)
CO2 SERPL-SCNC: 22 MMOL/L (ref 23–31)
CREAT SERPL-MCNC: 0.72 MG/DL (ref 0.55–1.02)
CREAT/UREA NIT SERPL: 42
ERYTHROCYTE [DISTWIDTH] IN BLOOD BY AUTOMATED COUNT: 12.8 % (ref 11–14.5)
GFR SERPLBLD CREATININE-BSD FMLA CKD-EPI: >60 MLS/MIN/1.73/M2
GLUCOSE SERPL-MCNC: 154 MG/DL (ref 75–121)
HCT VFR BLD AUTO: 36.2 % (ref 37–47)
HGB BLD-MCNC: 11.2 GM/DL (ref 12–16)
LACTATE SERPL-SCNC: 1.9 MMOL/L (ref 0.5–2.2)
MAGNESIUM SERPL-MCNC: 2.3 MG/DL (ref 1.6–2.6)
MCH RBC QN AUTO: 31 PG
MCHC RBC AUTO-ENTMCNC: 30.9 MG/DL (ref 33–36)
MCV RBC AUTO: 100.3 FL (ref 80–94)
NRBC BLD AUTO-RTO: 0 % (ref 0–1)
PHOSPHATE SERPL-MCNC: 2.3 MG/DL (ref 2.3–4.7)
PLATELET # BLD AUTO: 232 X10(3)/MCL (ref 140–371)
PMV BLD AUTO: 9.7 FL (ref 9.4–12.4)
POCT GLUCOSE: 178 MG/DL (ref 70–110)
POCT GLUCOSE: 191 MG/DL (ref 70–110)
POTASSIUM SERPL-SCNC: 4.4 MMOL/L (ref 3.5–5.1)
RBC # BLD AUTO: 3.61 X10(6)/MCL (ref 4.2–5.4)
SODIUM SERPL-SCNC: 147 MMOL/L (ref 132–146)
WBC # SPEC AUTO: 10.5 X10(3)/MCL (ref 4.5–11.5)

## 2022-12-28 PROCEDURE — 83735 ASSAY OF MAGNESIUM: CPT | Performed by: INTERNAL MEDICINE

## 2022-12-28 PROCEDURE — 63600175 PHARM REV CODE 636 W HCPCS: Performed by: INTERNAL MEDICINE

## 2022-12-28 PROCEDURE — 85027 COMPLETE CBC AUTOMATED: CPT | Performed by: INTERNAL MEDICINE

## 2022-12-28 PROCEDURE — 25000003 PHARM REV CODE 250: Performed by: INTERNAL MEDICINE

## 2022-12-28 PROCEDURE — 83605 ASSAY OF LACTIC ACID: CPT | Performed by: NURSE PRACTITIONER

## 2022-12-28 PROCEDURE — 21400001 HC TELEMETRY ROOM

## 2022-12-28 PROCEDURE — 84100 ASSAY OF PHOSPHORUS: CPT | Performed by: INTERNAL MEDICINE

## 2022-12-28 PROCEDURE — 80048 BASIC METABOLIC PNL TOTAL CA: CPT | Performed by: INTERNAL MEDICINE

## 2022-12-28 PROCEDURE — 11000001 HC ACUTE MED/SURG PRIVATE ROOM

## 2022-12-28 PROCEDURE — 92610 EVALUATE SWALLOWING FUNCTION: CPT

## 2022-12-28 PROCEDURE — 25000003 PHARM REV CODE 250: Performed by: NURSE PRACTITIONER

## 2022-12-28 RX ORDER — INSULIN ASPART 100 [IU]/ML
1-10 INJECTION, SOLUTION INTRAVENOUS; SUBCUTANEOUS EVERY 6 HOURS PRN
Status: DISCONTINUED | OUTPATIENT
Start: 2022-12-28 | End: 2023-01-04 | Stop reason: HOSPADM

## 2022-12-28 RX ORDER — PROCHLORPERAZINE EDISYLATE 5 MG/ML
5 INJECTION INTRAMUSCULAR; INTRAVENOUS EVERY 6 HOURS PRN
Status: DISCONTINUED | OUTPATIENT
Start: 2022-12-28 | End: 2023-01-04 | Stop reason: HOSPADM

## 2022-12-28 RX ORDER — SODIUM CHLORIDE 450 MG/100ML
INJECTION, SOLUTION INTRAVENOUS CONTINUOUS
Status: DISCONTINUED | OUTPATIENT
Start: 2022-12-28 | End: 2022-12-28

## 2022-12-28 RX ORDER — POLYETHYLENE GLYCOL 3350 17 G/17G
17 POWDER, FOR SOLUTION ORAL 2 TIMES DAILY PRN
Status: DISCONTINUED | OUTPATIENT
Start: 2022-12-28 | End: 2023-01-04 | Stop reason: HOSPADM

## 2022-12-28 RX ORDER — GLUCAGON 1 MG
1 KIT INJECTION
Status: DISCONTINUED | OUTPATIENT
Start: 2022-12-28 | End: 2023-01-04 | Stop reason: HOSPADM

## 2022-12-28 RX ORDER — DEXTROSE MONOHYDRATE AND SODIUM CHLORIDE 5; .45 G/100ML; G/100ML
INJECTION, SOLUTION INTRAVENOUS CONTINUOUS
Status: DISCONTINUED | OUTPATIENT
Start: 2022-12-28 | End: 2022-12-28

## 2022-12-28 RX ORDER — CARBIDOPA AND LEVODOPA 10; 100 MG/1; MG/1
2 TABLET ORAL 3 TIMES DAILY
Status: DISCONTINUED | OUTPATIENT
Start: 2022-12-28 | End: 2023-01-04 | Stop reason: HOSPADM

## 2022-12-28 RX ORDER — ONDANSETRON 2 MG/ML
4 INJECTION INTRAMUSCULAR; INTRAVENOUS EVERY 4 HOURS PRN
Status: DISCONTINUED | OUTPATIENT
Start: 2022-12-28 | End: 2022-12-28

## 2022-12-28 RX ORDER — TALC
6 POWDER (GRAM) TOPICAL NIGHTLY PRN
Status: DISCONTINUED | OUTPATIENT
Start: 2022-12-28 | End: 2023-01-04 | Stop reason: HOSPADM

## 2022-12-28 RX ORDER — SODIUM CHLORIDE 0.9 % (FLUSH) 0.9 %
10 SYRINGE (ML) INJECTION
Status: DISCONTINUED | OUTPATIENT
Start: 2022-12-28 | End: 2023-01-04 | Stop reason: HOSPADM

## 2022-12-28 RX ORDER — AMOXICILLIN 250 MG
2 CAPSULE ORAL 2 TIMES DAILY PRN
Status: DISCONTINUED | OUTPATIENT
Start: 2022-12-28 | End: 2023-01-04 | Stop reason: HOSPADM

## 2022-12-28 RX ORDER — ACETAMINOPHEN 325 MG/1
650 TABLET ORAL EVERY 4 HOURS PRN
Status: DISCONTINUED | OUTPATIENT
Start: 2022-12-28 | End: 2023-01-04 | Stop reason: HOSPADM

## 2022-12-28 RX ORDER — ACETAMINOPHEN 500 MG
1000 TABLET ORAL EVERY 6 HOURS PRN
Status: DISCONTINUED | OUTPATIENT
Start: 2022-12-28 | End: 2023-01-04 | Stop reason: HOSPADM

## 2022-12-28 RX ORDER — MAG HYDROX/ALUMINUM HYD/SIMETH 200-200-20
30 SUSPENSION, ORAL (FINAL DOSE FORM) ORAL 4 TIMES DAILY PRN
Status: DISCONTINUED | OUTPATIENT
Start: 2022-12-28 | End: 2023-01-04 | Stop reason: HOSPADM

## 2022-12-28 RX ORDER — AMPICILLIN 500 MG/1
500 CAPSULE ORAL 3 TIMES DAILY
Status: ON HOLD | COMMUNITY
End: 2023-01-04 | Stop reason: HOSPADM

## 2022-12-28 RX ORDER — LACTULOSE 10 G/15ML
30 SOLUTION ORAL; RECTAL 2 TIMES DAILY
COMMUNITY

## 2022-12-28 RX ORDER — ENOXAPARIN SODIUM 100 MG/ML
40 INJECTION SUBCUTANEOUS EVERY 24 HOURS
Status: DISCONTINUED | OUTPATIENT
Start: 2022-12-28 | End: 2023-01-04 | Stop reason: HOSPADM

## 2022-12-28 RX ORDER — SIMETHICONE 80 MG
1 TABLET,CHEWABLE ORAL 4 TIMES DAILY PRN
Status: DISCONTINUED | OUTPATIENT
Start: 2022-12-28 | End: 2023-01-04 | Stop reason: HOSPADM

## 2022-12-28 RX ORDER — CARBIDOPA AND LEVODOPA 10; 100 MG/1; MG/1
2 TABLET ORAL 3 TIMES DAILY
COMMUNITY

## 2022-12-28 RX ADMIN — Medication 6 MG: at 10:12

## 2022-12-28 RX ADMIN — ENOXAPARIN SODIUM 40 MG: 40 INJECTION SUBCUTANEOUS at 05:12

## 2022-12-28 RX ADMIN — SODIUM CHLORIDE: 4.5 INJECTION, SOLUTION INTRAVENOUS at 01:12

## 2022-12-28 RX ADMIN — CEFTRIAXONE SODIUM 1 G: 1 INJECTION, POWDER, FOR SOLUTION INTRAMUSCULAR; INTRAVENOUS at 06:12

## 2022-12-28 RX ADMIN — CARBIDOPA AND LEVODOPA 2 TABLET: 10; 100 TABLET ORAL at 02:12

## 2022-12-28 RX ADMIN — INSULIN ASPART 1 UNITS: 100 INJECTION, SOLUTION INTRAVENOUS; SUBCUTANEOUS at 10:12

## 2022-12-28 RX ADMIN — CARBIDOPA AND LEVODOPA 2 TABLET: 10; 100 TABLET ORAL at 08:12

## 2022-12-28 NOTE — ED PROVIDER NOTES
Encounter Date: 12/27/2022    SCRIBE #1 NOTE: I, Nirmal Winkler, am scribing for, and in the presence of,  Baltazar Starr MD. I have scribed the following portions of the note -     History     Chief Complaint   Patient presents with    Altered Mental Status     EMS reports pt from NH with GCS-10-11, usually GCS 14, Dx with UTI on Sunday, started amoxicillin.  and Hx of Parkinson's. Also, Hx of sacral wound.      A 89 y/o female with a history of HTN, DM type II, dysphagia, Parkinson's disease, muscle wasting, and a chronic sacral wound presents to [facility] ED via EMS as a transfer from [facility] with worsening altered mental status over the past couple of days. Pt was diagnosed with a UTI on 12/25/2022. Pt's baseline GCS is 14, but pt is not at her baseline at this time.  She has been on amoxicillin    PCP: Hieu Rebollar MD    The history is provided by the nursing home and the EMS personnel. The history is limited by the condition of the patient.   Altered Mental Status  This is a new problem. The current episode started 2 days ago. The problem occurs constantly. The problem has been gradually worsening. Pertinent negatives include no chest pain, no abdominal pain, no headaches and no shortness of breath. Nothing aggravates the symptoms. Nothing relieves the symptoms. She has tried nothing for the symptoms. The treatment provided no relief.   Review of patient's allergies indicates:   Allergen Reactions    Seroquel [quetiapine]      History reviewed. No pertinent past medical history.  History reviewed. No pertinent surgical history.  History reviewed. No pertinent family history.     Review of Systems   Constitutional:  Negative for chills and fever.   HENT:  Negative for congestion and ear pain.    Eyes:  Negative for discharge.   Respiratory:  Negative for cough, shortness of breath and wheezing.    Cardiovascular:  Negative for chest pain and leg swelling.    Gastrointestinal:  Negative for abdominal pain, constipation, diarrhea, nausea and vomiting.   Genitourinary:  Negative for dysuria, flank pain and frequency.   Musculoskeletal:  Negative for back pain, joint swelling and myalgias.   Skin:  Negative for rash.   Neurological:  Negative for dizziness, syncope, weakness and headaches.   Psychiatric/Behavioral:  Negative for agitation and hallucinations.         Altered mental status   All other systems reviewed and are negative.    Physical Exam     Initial Vitals [12/27/22 1942]   BP Pulse Resp Temp SpO2   (!) 162/87 91 18 97.7 °F (36.5 °C) 100 %      MAP       --         Physical Exam    Nursing note and vitals reviewed.  Constitutional: She appears well-developed and well-nourished. She appears lethargic. No distress.   HENT:   Head: Normocephalic and atraumatic.   Pt's lips and tongue are dry.    Eyes: Conjunctivae and EOM are normal. Pupils are equal, round, and reactive to light.   Pt has dry mucus in R eyelashes.    Cardiovascular:  Normal rate and intact distal pulses.           Pulmonary/Chest: No respiratory distress. She has no rhonchi.   Abdominal: Abdomen is soft. Bowel sounds are normal. There is no abdominal tenderness. There is no rebound and no guarding.   Musculoskeletal:         General: No edema.     Neurological: She has normal strength. She appears lethargic.   Skin: Skin is warm and dry.   Pt has a stage 2 sacral wound with ointment applied.        ED Course   Procedures  Labs Reviewed   CBC WITH DIFFERENTIAL - Abnormal; Notable for the following components:       Result Value    MCV 99.3 (*)     MCHC 31.1 (*)     All other components within normal limits   COMPREHENSIVE METABOLIC PANEL - Abnormal; Notable for the following components:    Sodium Level 149 (*)     Chloride 115 (*)     Glucose Level 129 (*)     Blood Urea Nitrogen 33.2 (*)     Protein Total 8.1 (*)     Globulin 4.7 (*)     Albumin/Globulin Ratio 0.7 (*)     All other components  within normal limits   URINALYSIS, REFLEX TO URINE CULTURE - Abnormal; Notable for the following components:    Appearance, UA Cloudy (*)     Protein, UA 2+ (*)     Glucose, UA 3+ (*)     Blood, UA Trace (*)     Leukocyte Esterase, UA 2+ (*)     All other components within normal limits   LACTIC ACID, PLASMA - Abnormal; Notable for the following components:    Lactic Acid Level 3.3 (*)     All other components within normal limits   URINALYSIS, MICROSCOPIC - Abnormal; Notable for the following components:    WBC, UA >100 (*)     Bacteria, UA 4+ (*)     All other components within normal limits   MAGNESIUM - Normal   COVID/FLU A&B PCR - Normal    Narrative:     The Xpert Xpress SARS-CoV-2/FLU/RSV plus is a rapid, multiplexed real-time PCR test intended for the simultaneous qualitative detection and differentiation of SARS-CoV-2, Influenza A, Influenza B, and respiratory syncytial virus (RSV) viral RNA in either nasopharyngeal swab or nasal swab specimens.         BLOOD CULTURE [facility]   BLOOD CULTURE [facility]   CULTURE, URINE   LACTIC ACID, PLASMA        ECG Results              EKG 12-lead (Final result)  Result time 12/27/22 23:26:39      Final result by Interface, Lab In [facility] (12/27/22 23:26:39)                   Narrative:    Test Reason : R41.82,    Vent. Rate : 073 BPM     Atrial Rate : 073 BPM     P-R Int : 158 ms          QRS Dur : 096 ms      QT Int : 486 ms       P-R-T Axes : 082 -38 072 degrees     QTc Int : 535 ms    Normal sinus rhythm  Left axis deviation  Right ventricular conduction delay  Abnormal ECG  No previous ECGs available  Confirmed by José AVALOS, Broderick ([id number]) on 12/27/2022 11:26:35 PM    Referred By: [email or web address]   SELF           Confirmed By:Broderick Kumar MD                      ED Interpretation by Baltazar Starr MD (12/27/22 22:49:08, [facility] - Emergency Dept, Emergency Medicine)    EKG at 10:23 p.m..  73 beats per minute.  Normal sinus rhythm no ST segment elevations or depressions.   There is some motion artifact.                                  Imaging Results              X-Ray Chest 1 View (In process)                      CT Head Without Contrast (Final result)  Result time 12/27/22 20:25:39      Final result by George Galicia MD (12/27/22 20:25:39)                   Impression:      No acute intracranial abnormality identified.  Findings of chronic microvascular ischemic disease.    There is increased CSF space on the left as compared to the right minimally which may represent chronic subdural collection. Further evaluation may be obtained with nonemergent MR.      Electronically signed by: George Galicia  Date:    12/27/2022  Time:    20:25               Narrative:    EXAMINATION:  CT HEAD WITHOUT CONTRAST    CLINICAL HISTORY:  Transient ischemic attack (TIA);    TECHNIQUE:  Low dose axial images were obtained through the head.  Coronal and sagittal reformations were also performed. Contrast was not administered.    Automatic exposure control was utilized to reduce the patient's radiation dose.    DLP= 1101    COMPARISON:  11/13/2016    FINDINGS:  No acute intracranial hemorrhage, edema or mass. No acute parenchymal abnormality.    There is increased CSF space on the left as compared to the right minimally which may represent chronic subdural collection.  Further evaluation may be obtained with nonemergent MR.    Diffuse cerebral atrophy with concordant ventricular enlargement    Scattered hypodensities throughout the deep periventricular white matter.    The osseous structures are normal.    The mastoid air cells are clear.    The auditory canals are patent bilaterally.    The globes and orbital contents are normal bilaterally.    The visualized maxillary, ethmoid and sphenoid sinuses are clear.                                       Medications   piperacillin-tazobactam (ZOSYN) 4.5 g in dextrose 5 % in water (D5W) 5 % 100 mL IVPB (MB+) (4.5 g Intravenous New Bag 12/27/22 3170)    sodium chloride 0.9% bolus 1,000 mL 1,000 mL (0 mLs Intravenous Stopped 12/27/22 2300)              Scribe Attestation:   Scribe #1: I performed the above scribed service and the documentation accurately describes the services I performed. I attest to the accuracy of the note.    Attending Attestation:           Physician Attestation for Scribe:  Physician Attestation Statement for Scribe #1: I, Baltazar Starr MD, reviewed documentation, as scribed by Nirmal Winkler in my presence, and it is both accurate and complete.           ED Course as of 12/27/22 2331 Tue Dec 27, 2022   2258 Patient has 4+ bacteria in the urine despite outpatient treatment with antibiotics.  IV Zosyn has been started empirically.  Continue IV fluids.  Discussed with the hospitalistCarmen who will admit [LF]      ED Course User Index  [LF] Baltazar Starr MD                 Clinical Impression:   Final diagnoses:  [R41.82] Altered mental state  [G93.40] Acute encephalopathy (Primary)  [N39.0] Acute UTI  [Z78.9] Failure of outpatient treatment        ED Disposition Condition    Admit Stable                Baltazar Starr MD  12/27/22 2331

## 2022-12-28 NOTE — NURSING
1120 pt arrived from ER with left forearm iv infiltrated, diaper wet and, changed dressing to the sacral wound, no family at the bedside

## 2022-12-28 NOTE — NURSING
Nurses Note -- 4 Eyes      12/28/2022   11:01 AM      Skin assessed during: Admit      [] No Pressure Injuries Present    []Prevention Measures Documented      [x] Yes- Altered Skin Integrity Present or Discovered   [x] LDA Added if Not in Epic (Describe Wound)   [x] New Altered Skin Integrity was Present on Admit and Documented in LDA   [x] Wound Image Taken    Wound Care Consulted? Yes    Attending Nurse:  Pelon Driscoll RN     Second RN/Staff Member:  Spike Hernandez RN

## 2022-12-28 NOTE — PLAN OF CARE
12/28/22 1537   Discharge Planning   Assessment Type Discharge Planning Assessment   Support Systems Children  (son, Rm---[phone])   Equipment Currently Used at Home   (Toña chair)   Current Living Arrangements residential facility   Care Facility Name [facility]   Patient/Family Anticipates Transition to long-term care facility   Patient/Family Anticipated Services at Transition   (Residential NH)   DME Needed Upon Discharge  other (see comments)  (TBD)   Discharge Plan A   (NH placement)   Discharge Plan B   (NH placement)     Pt's son, Rm is the  [phone]). Rm does not want the pt to go back to [facility]. Rm will be looking for a different facility.

## 2022-12-28 NOTE — PT/OT/SLP EVAL
Health Maintenance Summary     Topic Due On Due Status Completed On Postpone Until Reason    MAMMOGRAM - BREAST CANCER SCREENING Dec 16, 2018 Not Due Dec 16, 2016      PAP SMEAR - CERVICAL CANCER SCREENING Nov 21, 2019 Not Due Nov 21, 2016      Colorectal Cancer Screening - Colonoscopy Dec 31, 2019 Not Due Dec 31, 2014      Immunization-Zoster  Completed Sep 26, 2013      Immunization - TDAP Pregnancy  Hidden       IMMUNIZATION - DTaP/Tdap/Td Nov 24, 2020 Not Due Nov 24, 2010      Kelton Tracey Sep 1, 2017 Postponed Oct 21, 2016 Nov 29, 2017 Patient Refused    Depression Screening Feb 29, 1968 Postponed  Nov 29, 2017 Patient Refused    Hepatitis C Screening Feb 28, 2007 Postponed  Nov 29, 2017 Patient Refused          Patient is due for topics as listed above, she wishes to decline at this time . Speech Language Pathology Department  Clinical Swallow Evaluation    Patient Name:  Mely Abdullahi   MRN:  [id number]  Admitting Diagnosis: UTI (urinary tract infection)    Recommendations:     General recommendations:  SLP follow up x1  Diet recommendations:  Minced & Moist Diet - IDDSI Level 5, Liquid Diet Level: Thin liquids - IDDSI Level 0   Swallow strategies/precautions: small bites/sips, slow rate, additional swallow per bite/sip, alternate solids/liquids, 1:1 assist with all meals, feed only when awake/alert, and medications crushed in puree  Precautions: Standard, aspiration    History:     History reviewed. No pertinent past medical history.    History reviewed. No pertinent surgical history.    Home Diet:  mechanical soft with thin liquids  Current Method of Nutrition: NPO    Subjective     Patient awake and alert.    Patient goals: none stated     Spiritual/Cultural/Synagogue Beliefs/Practices that affect care: no    Pain/Comfort: Pain Rating 1: 0/10    Respiratory Status: room air     Objective:     Oral Musculature Evaluation  Oral Musculature: general weakness  Dentition: edentulous  Secretion Management: adequate  Mucosal Quality: good  Mandibular Strength and Mobility: WFL  Oral Labial Strength and Mobility: WFL    Consistency Fed By Oral Symptoms Pharyngeal Symptoms   Thin liquid by spoon SLP None None   Thin liquid by cup SLP None None   Thin liquid by straw SLP None None   Puree SLP None None   Minced and Moist  SLP None None     Assessment:     Pt presents with functional oropharyngeal swallow with no overt signs/sx of aspiration during PO intake. Pt's home diet consist of mechanical soft with thin liquids. Pt requires 1:1 assist with ALL meals. SLP to follow up regarding diet tolerance in AM.     Patient Education:     Patient and family provided with verbal education regarding plan of care.  Understanding was verbalized.    Plan:     SLP Follow-Up:  Yes   Plan of Care reviewed with:  patient, son,  family      Time Tracking:     SLP Treatment Date:   12/28/22  Speech Start Time:  1350  Speech Stop Time:  1410     Speech Total Time (min):  20 min    Billable minutes:  Swallow and Oral Function Evaluation, 20 minutes      12/28/2022

## 2022-12-28 NOTE — H&P
[facility] Hospital Medicine   History & Physical Note      Patient Name: Mely Abdullahi  : 1932  MRN: [id number]  PCP: No primary care provider on file.  Admitting Service: Hospital Medicine  Attending Physician: Abdon Reaves MD  Admission Date: 2022 - IP- Inpatient   Length of Stay: 1  History source: EMR, patient and/or patient's family  Code status: Full    Chief Complaint   Altered Mental Status (EMS reports pt from NH with GCS-10-11, usually GCS 14, Dx with UTI on , started amoxicillin.  and Hx of Parkinson's. Also, Hx of sacral wound. )    History of Present Illness   Ms. Abdullahi is a 89 yo female with hx DM2, Parkinsons and chronic sacral wound and dysphagia on soft diet presents to the ED with AMS. She was dx with UTI on  and started on Ampicillin but mentation has been worsening.and she is not eating or drinking much. Baseline GCS 14.    Initial ED VS:  T 97.7°, HR 91, R 18, /87, O2 100% RA.  Labs remarkable for a sodium of 149, chloride 115, BUN 33.2, lactic acid 3.3, WBC 8.  Urine culture from  greater than 100,000 colonies of Proteus mirabilis, sensitive to ceftriaxone. CT Head increased CSF space of the left as compared to the right minimally much may represent a chronic subdural collection    Pt seen and examined. She is drowsy and disoriented. Son at bedside, states this is not her baseline mental status. She can normally carry a coversation with you, no hx of dementia. He also wants her moved from the nursing home she is at [place] as he is not pleased with the care that has been shown to his mother.         ROS   Unable to obtain due to encephalopathy    Past Medical History   Parkinson Disease  IDDM2  Oropharyngeal dysphagia on soft diet  Debility 2/2 parkinson's and is bedbound    Past Surgical History   Unable to obtain due to encephalopathy    Social History   Denies Tobacco use, alcohol use  or illicit drug use. Lives in [facility] home, bedbound.      Family History   Reviewed and negative    Allergies   Seroquel [quetiapine]    Home Medications     Prior to Admission medications    Medication Sig Start Date End Date Taking? Authorizing Provider   ampicillin (PRINCIPEN) 500 MG capsule Take 500 mg by mouth 3 (three) times daily.   Yes Historical Provider   carbidopa-levodopa  mg (SINEMET)  mg per tablet Take 2 tablets by mouth 3 (three) times daily.   Yes Historical Provider   insulin glargine,hum.rec.anlog (LANTUS SOLOSTAR U-100 INSULIN SUBQ) Inject 22 Units into the skin every evening.   Yes Historical Provider   insulin regular 100 unit/mL Inj injection Inject into the skin 3 (three) times daily before meals. Per sliding scale   Yes Historical Provider   lactulose (CHRONULAC) 10 gram/15 mL solution Take 30 g by mouth 2 (two) times daily.   Yes Historical Provider          Physical Exam   Vital Signs  Temp:  [97.7 °F (36.5 °C)]   Pulse:  [91-99]   Resp:  [18-27]   BP: (152-171)/(82-92)   SpO2:  [97 %-100 %]    General: drowsy, confused  HEENT: NC/AT  Neck:  No JVD  Chest: CTABL  CVS: Regular rhythm. Normal S1/S2.  Abdomen: nondistended, normoactive BS, soft and non-tender.  MSK: No obvious deformity or joint swelling, diffuse muscle atrophy to BLE  Skin: Warm and dry, sacral decubitus (see chart for pics)  Neuro:  disoriented, moves all extremities  Psych: Cooperative    Labs     Recent Labs     12/27/22 2158   WBC 8.0   RBC 4.40   HGB 13.6   HCT 43.7   MCV 99.3*   MCH 30.9   MCHC 31.1*   RDW 12.8           Recent Labs     12/27/22 2159   *   K 4.3   CHLORIDE 115*   CO2 25   BUN 33.2*   CREATININE 0.80   EGFRNORACEVR >60   GLUCOSE 129*   CALCIUM 9.5   MG 2.60   ALBUMIN 3.4   GLOBULIN 4.7*   ALKPHOS 81   ALT <5   AST 19   BILITOT 0.5     Recent Labs     12/27/22 2158 12/28/22  0006   LACTIC 3.3* 1.9            Microbiology Results (last 7 days)       Procedure Component Value  Units Date/Time    Urine culture [[id number]] Collected: 12/27/22 2207    Order Status: Sent Specimen: Urine, Catheterized Updated: 12/27/22 2236    Blood culture #2 **CANNOT BE ORDERED STAT** [[id number]] Collected: 12/27/22 2158    Order Status: Resulted Specimen: Blood Updated: 12/27/22 2218    Blood culture #1 **CANNOT BE ORDERED STAT** [[id number]] Collected: 12/27/22 2158    Order Status: Resulted Specimen: Blood Updated: 12/27/22 2218           Imaging     CT Head Without Contrast   Final Result      No acute intracranial abnormality identified.  Findings of chronic microvascular ischemic disease.      There is increased CSF space on the left as compared to the right minimally which may represent chronic subdural collection. Further evaluation may be obtained with nonemergent MR.         Electronically signed by: George Galicia   Date:    12/27/2022   Time:    20:25      X-Ray Chest 1 View    (Results Pending)     Assessment & Plan   Acute Metabolic Encephalopathy  Proteus Mirabilis UTI (POA)  Hypovolemic Hypernatremia/dehydration  QT Prolongation  Increase CSF L>R, may represent chronic subdural collection/hygroma  Oropharyngeal Dysphagia  Stage II Sacral Decubitus (chronic)  Parkinson's Disease      PLAN:  - BCX2, UC from 12/24 proteus mirabilis   - IV Rocephin 1 gm Q24H  - 1/2 NS @ 100ml/hr.  - Neuro checks Q4H. MRI Brain to r/o subdural collection, no prior imaging to compare.   - Telemetry. Avoid QT Prolonging agents. Check Mag, Phos  - Home meds reviewed.  - VTE Prophylaxis: Carmen Redd, OLIVEPBrittneyC have discussed this patients case with Dr. Reaves who agrees with the diagnosis and treatment plan.  -----------------------------------    Abdon Reaves MD  I performed the substantive portion of this visit. I had a face-to-face time with the patient on [12/27/2022]. I reviewed the nurse practitioner's history, physical exam and plan of care.      History:  89-year-old history of Parkinson and early  dementia lives in a nursing home present to the ED with increasing lethargy she was just diagnosed with UTI on 12/22/2022 with Proteus started on amoxicillin on 12/25/2022 her symptom did not improve or which was sent to the ED labs notable for dehydration, hypernatremia.    Physical exam:  Dry skin and oral mucosa, sacral decubitus ulcer does not appear to be infected, arousable able to state her name otherwise no much verbal response    Medical decision making:  Continue IV hydration with half-normal saline and will start ceftriaxone 1 g IV daily based on sensitivity, resume carbidopa levodopa.  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  Sound requesting transfer to a different nursing home on discharge will consult case management for assistance.

## 2022-12-28 NOTE — CARE UPDATE
[id number] Spoke with Angela with [facility] who reported they do not want pt sent back even though she is a resident at their facility. Angela reported the pt's POA, son has been abandon from their facility. He was given some form stating this.   Spoke with pt's son, Rm who reported the NH was neglecting his mother. Rm would like to place his mother at another facility. He will have a place picked out by noon tomorrow. He will contact me tomorrow. Spoke with Angela at [facility] who will fax me the 142.

## 2022-12-29 LAB
ANION GAP SERPL CALC-SCNC: 6 MEQ/L
BACTERIA UR CULT: ABNORMAL
BASOPHILS # BLD AUTO: 0.03 X10(3)/MCL (ref 0–0.2)
BASOPHILS NFR BLD AUTO: 0.5 %
BUN SERPL-MCNC: 19.4 MG/DL (ref 9.8–20.1)
CALCIUM SERPL-MCNC: 8 MG/DL (ref 8.4–10.2)
CHLORIDE SERPL-SCNC: 110 MMOL/L (ref 98–111)
CO2 SERPL-SCNC: 21 MMOL/L (ref 23–31)
CREAT SERPL-MCNC: 0.65 MG/DL (ref 0.55–1.02)
CREAT/UREA NIT SERPL: 30
EOSINOPHIL # BLD AUTO: 0.11 X10(3)/MCL (ref 0–0.9)
EOSINOPHIL NFR BLD AUTO: 2 %
ERYTHROCYTE [DISTWIDTH] IN BLOOD BY AUTOMATED COUNT: 12.7 % (ref 11–14.5)
GFR SERPLBLD CREATININE-BSD FMLA CKD-EPI: >60 MLS/MIN/1.73/M2
GLUCOSE SERPL-MCNC: 145 MG/DL (ref 75–121)
HCT VFR BLD AUTO: 34.1 % (ref 37–47)
HGB BLD-MCNC: 10.9 GM/DL (ref 12–16)
IMM GRANULOCYTES # BLD AUTO: 0.02 X10(3)/MCL (ref 0–0.04)
IMM GRANULOCYTES NFR BLD AUTO: 0.4 %
LYMPHOCYTES # BLD AUTO: 2.24 X10(3)/MCL (ref 0.6–4.6)
LYMPHOCYTES NFR BLD AUTO: 39.8 %
MAGNESIUM SERPL-MCNC: 2.2 MG/DL (ref 1.6–2.6)
MCH RBC QN AUTO: 31.5 PG
MCHC RBC AUTO-ENTMCNC: 32 MG/DL (ref 33–36)
MCV RBC AUTO: 98.6 FL (ref 80–94)
MONOCYTES # BLD AUTO: 0.38 X10(3)/MCL (ref 0.1–1.3)
MONOCYTES NFR BLD AUTO: 6.7 %
NEUTROPHILS # BLD AUTO: 2.85 X10(3)/MCL (ref 2.1–9.2)
NEUTROPHILS NFR BLD AUTO: 50.6 %
NRBC BLD AUTO-RTO: 0 % (ref 0–1)
PLATELET # BLD AUTO: 223 X10(3)/MCL (ref 140–371)
PMV BLD AUTO: 10 FL (ref 9.4–12.4)
POCT GLUCOSE: 140 MG/DL (ref 70–110)
POCT GLUCOSE: 214 MG/DL (ref 70–110)
POTASSIUM SERPL-SCNC: 4.2 MMOL/L (ref 3.5–5.1)
RBC # BLD AUTO: 3.46 X10(6)/MCL (ref 4.2–5.4)
SODIUM SERPL-SCNC: 137 MMOL/L (ref 132–146)
WBC # SPEC AUTO: 5.6 X10(3)/MCL (ref 4.5–11.5)

## 2022-12-29 PROCEDURE — 83735 ASSAY OF MAGNESIUM: CPT | Performed by: INTERNAL MEDICINE

## 2022-12-29 PROCEDURE — 80048 BASIC METABOLIC PNL TOTAL CA: CPT | Performed by: INTERNAL MEDICINE

## 2022-12-29 PROCEDURE — 21400001 HC TELEMETRY ROOM

## 2022-12-29 PROCEDURE — 25000003 PHARM REV CODE 250: Performed by: NURSE PRACTITIONER

## 2022-12-29 PROCEDURE — 63600175 PHARM REV CODE 636 W HCPCS: Performed by: INTERNAL MEDICINE

## 2022-12-29 PROCEDURE — 85025 COMPLETE CBC W/AUTO DIFF WBC: CPT | Performed by: INTERNAL MEDICINE

## 2022-12-29 PROCEDURE — 25000003 PHARM REV CODE 250: Performed by: INTERNAL MEDICINE

## 2022-12-29 PROCEDURE — 36415 COLL VENOUS BLD VENIPUNCTURE: CPT | Performed by: INTERNAL MEDICINE

## 2022-12-29 RX ADMIN — LEUCINE, PHENYLALANINE, LYSINE, METHIONINE, ISOLEUCINE, VALINE, HISTIDINE, THREONINE, TRYPTOPHAN, ALANINE, GLYCINE, ARGININE, PROLINE, SERINE, TYROSINE, DEXTROSE: 311; 238; 247; 170; 255; 247; 204; 179; 77; 880; 438; 489; 289; 213; 17; 5 INJECTION INTRAVENOUS at 10:12

## 2022-12-29 RX ADMIN — Medication 6 MG: at 08:12

## 2022-12-29 RX ADMIN — INSULIN ASPART 1 UNITS: 100 INJECTION, SOLUTION INTRAVENOUS; SUBCUTANEOUS at 10:12

## 2022-12-29 RX ADMIN — ACETAMINOPHEN 1000 MG: 500 TABLET ORAL at 11:12

## 2022-12-29 RX ADMIN — CARBIDOPA AND LEVODOPA 2 TABLET: 10; 100 TABLET ORAL at 08:12

## 2022-12-29 RX ADMIN — CEFTRIAXONE SODIUM 1 G: 1 INJECTION, POWDER, FOR SOLUTION INTRAMUSCULAR; INTRAVENOUS at 05:12

## 2022-12-29 RX ADMIN — LEUCINE, PHENYLALANINE, LYSINE, METHIONINE, ISOLEUCINE, VALINE, HISTIDINE, THREONINE, TRYPTOPHAN, ALANINE, GLYCINE, ARGININE, PROLINE, SERINE, TYROSINE, DEXTROSE: 311; 238; 247; 170; 255; 247; 204; 179; 77; 880; 438; 489; 289; 213; 17; 5 INJECTION INTRAVENOUS at 02:12

## 2022-12-29 RX ADMIN — CARBIDOPA AND LEVODOPA 2 TABLET: 10; 100 TABLET ORAL at 02:12

## 2022-12-29 RX ADMIN — ENOXAPARIN SODIUM 40 MG: 40 INJECTION SUBCUTANEOUS at 04:12

## 2022-12-29 NOTE — CARE UPDATE
[id number] Rec call from Rm calvert's son who requested I send a referral to [facility]. Referral sent to [facility] thru care port.  Rec [phone] from [facility]. Curtis LORD

## 2022-12-29 NOTE — PT/OT/SLP DISCHARGE
Speech Language Pathology Department  Diet Tolerance Follow-up/Discharge    Patient Name:  Mely Abdullahi   MRN:  [id number]  Admitting Diagnosis: UTI (urinary tract infection)    Recommendations:     General recommendations:  SLP intervention not indicated  Diet recommendations:  Minced & Moist Diet - IDDSI Level 5, Liquid Diet Level: Thin liquids - IDDSI Level 0   Swallow strategies/precautions: small bites/sips, slow rate, additional swallow per bite/sip, 1:1 assist with all meals, and medications crushed in puree  Precautions: Standard, aspiration    Diet tolerance:     Nursing reports NO difficulty regarding diet tolerance. SLP to sign off at this time. Please re-consult as needed.     12/29/2022

## 2022-12-29 NOTE — PROGRESS NOTES
[facility] Medicine Progress Note        Chief Complaint: Inpatient Follow-up for     HPI: 89 yo female with hx DM2, Parkinsons and chronic sacral wound and dysphagia on soft diet presents to the ED with AMS. She was dx with UTI on Sunday 12/25 and started on Ampicillin but mentation has been worsening.and she is not eating or drinking much. Baseline GCS 14.     Initial ED VS:  T 97.7°, HR 91, R 18, /87, O2 100% RA.  Labs remarkable for a sodium of 149, chloride 115, BUN 33.2, lactic acid 3.3, WBC 8.  Urine culture from 12/24 greater than 100,000 colonies of Proteus mirabilis, sensitive to ceftriaxone. CT Head increased CSF space of the left as compared to the right minimally much may represent a chronic subdural collection MRI of the brain was ordered that shows no hydrocephalus or abnormal extra-axial fluid collection as it was shown in the CT scan there is mild chronic microvascular ischemic changes    Interval Hx:   Patient seen and examined son bedside was eating dinner    Objective/physical exam:  General: In no acute distress, afebrile  Chest: Clear to auscultation bilaterally  Heart: RRR, +S1, S2, no appreciable murmur  Abdomen: Soft, nontender, BS +  MSK: Warm, no lower extremity edema, no clubbing or cyanosis  Neurologic: Alert and awake  VITAL SIGNS: 24 HRS MIN & MAX LAST   Temp  Min: 97.3 °F (36.3 °C)  Max: 98.4 °F (36.9 °C) 97.5 °F (36.4 °C)   BP  Min: 125/69  Max: 177/75 131/68   Pulse  Min: 61  Max: 84  67   Resp  Min: 17  Max: 18 18   SpO2  Min: 96 %  Max: 100 % 98 %       Recent Labs   Lab 12/27/22  2158 12/28/22  0348 12/29/22  0500   WBC 8.0 10.5 5.6   RBC 4.40 3.61* 3.46*   HGB 13.6 11.2* 10.9*   HCT 43.7 36.2* 34.1*   MCV 99.3* 100.3* 98.6*   MCH 30.9 31.0 31.5   MCHC 31.1* 30.9* 32.0*   RDW 12.8 12.8 12.7    232 223   MPV 9.6 9.7 10.0       Recent Labs   Lab 12/27/22  2159 12/28/22  0348 12/29/22  0500   * 147* 137   K 4.3 4.4 4.2   CO2  25 22* 21*   BUN 33.2* 30.1* 19.4   CREATININE 0.80 0.72 0.65   CALCIUM 9.5 8.2* 8.0*   MG 2.60 2.30 2.20   ALBUMIN 3.4  --   --    ALKPHOS 81  --   --    ALT <5  --   --    AST 19  --   --    BILITOT 0.5  --   --           Microbiology Results (last 7 days)       Procedure Component Value Units Date/Time    Urine culture [[id number]]  (Abnormal) Collected: 12/27/22 2207    Order Status: Completed Specimen: Urine, Catheterized Updated: 12/29/22 1201     Urine Culture Less than 10,000 colonies/ml Proteus mirabilis     Comment: [place] counts <10,000/ml are of questionable significance and may or may not indicate contamination.  Therefore organisms are identified only.  If further workup is desired please notify Microbiology        Blood culture #1 **CANNOT BE ORDERED STAT** [[id number]]  (Normal) Collected: 12/27/22 2158    Order Status: Completed Specimen: Blood Updated: 12/28/22 2300     CULTURE, BLOOD ([facility]) No Growth At 24 Hours    Blood culture #2 **CANNOT BE ORDERED STAT** [[id number]]  (Normal) Collected: 12/27/22 2158    Order Status: Completed Specimen: Blood Updated: 12/28/22 2300     CULTURE, BLOOD ([facility]) No Growth At 24 Hours             See below for Radiology    Scheduled Med:   carbidopa-levodopa  mg  2 tablet Oral TID    cefTRIAXone (ROCEPHIN) IVPB  1 g Intravenous Q24H    enoxaparin  40 mg Subcutaneous Daily        Continuous Infusions:   amino acid 4.25% - dextrose 5% solution 65 mL/hr at 12/29/22 0226        PRN Meds:  acetaminophen, acetaminophen, aluminum-magnesium hydroxide-simethicone, dextrose 10%, dextrose 10%, glucagon (human recombinant), insulin aspart U-100, melatonin, polyethylene glycol, prochlorperazine, senna-docusate 8.6-50 mg, simethicone, sodium chloride 0.9%       Assessment/Plan:  Acute metabolic encephalopathy   Proteus UTI present on admission   Hypernatremia   QT prolongation   Oropharyngeal dysphagia   Stage II sacral decubitus ulcer  Parkinson's     I will continue with IV  Rocephin cultures grew Proteus from December 24th repeat blood cultures have been negative patient also had repeat urine cultures that showed less than 10,000 colonies of Proteus mirabilis but most likely partially treated UTI so we will continue with the treatment with IV antibiotics for now   Patient had MRI of the brain done that showed no extra-axial fluid collection  Speech was consulted but apparently there is no difficulty regarding diet tolerance so speech has signed off   Continue with other home medications that includes levodopa and carbidopa   Patient is on Clinimix at 65 cc an hour will continue with the current bag for now      VTE prophylaxis:     Patient condition:  Stable/Fair/Guarded/ Serious/ Critical    Anticipated discharge and Disposition:         All diagnosis and differential diagnosis have been reviewed; assessment and plan has been documented; I have personally reviewed the labs and test results that are presently available; I have reviewed the patients medication list; I have reviewed the consulting providers response and recommendations. I have reviewed or attempted to review medical records based upon their availability    All of the patient's questions have been  addressed and answered. Patient's is agreeable to the above stated plan. I will continue to monitor closely and make adjustments to medical management as needed.  _____________________________________________________________________    Nutrition Status:    Radiology:  MRI Brain Without Contrast  Narrative: EXAMINATION:  MRI BRAIN WITHOUT CONTRAST    CLINICAL HISTORY:  Transient ischemic attack (TIA);Stroke, follow up;    TECHNIQUE:  Multiplanar, multisequence MR images of the brain were obtained without the administration of intravenous contrast.    COMPARISON:  CT head dated 12/27/2022    FINDINGS:  There is no restricted diffusion, hemorrhage or edema. Mild scattered T2/FLAIR hyperintensities in the subcortical and  periventricular white matter likely represent chronic microvascular ischemic changes, with prior lacunar infarct in the left thalamus and carlie.    There is no mass effect or midline shift.  The basal cisterns are patent. There is moderate diffuse parenchymal volume loss. There is no hydrocephalus or abnormal extra-axial fluid collection.  The major intracranial flow voids are patent. The paranasal sinuses are clear. There is a small left mastoid effusion.  Impression: 1. No acute intracranial abnormality.  2. Mild chronic microvascular ischemic changes.    Electronically signed by: Nicol Wesley  Date:    12/28/2022  Time:    16:42  X-Ray Chest 1 View  Narrative: EXAMINATION:  XR CHEST 1 VIEW    CLINICAL HISTORY:  altered mental;    TECHNIQUE:  Single view of the chest    COMPARISON:  12/13/2016    FINDINGS:  No focal opacification, pleural effusion, or pneumothorax.    The cardiomediastinal silhouette is within normal limits.    No acute osseous abnormality.  Impression: No acute cardiopulmonary process.    Electronically signed by: George Galicia  Date:    12/28/2022  Time:    07:05      Gema Nina MD   12/29/2022

## 2022-12-29 NOTE — PLAN OF CARE
Uploaded 142 into Careport along with note to let Zofia Hart know that pt will be ready for discharge to their facility tomorrow.

## 2022-12-29 NOTE — PROGRESS NOTES
[facility] Medicine Progress Note        Chief Complaint: Inpatient Follow-up for Altered Mental Status (EMS reports pt from NH with GCS-10-11, usually GCS 14, Dx with UTI on Sunday, started amoxicillin.  and Hx of Parkinson's. Also, Hx of sacral wound. )    HPI: Ms. Abdullahi is a 89 yo female with hx DM2, Parkinsons and chronic sacral wound and dysphagia on soft diet presents to the ED with AMS. She was dx with UTI on Sunday 12/25 and started on Ampicillin but mentation has been worsening.and she is not eating or drinking much. Baseline GCS 14.         Initial ED VS:  T 97.7°, HR 91, R 18, /87, O2 100% RA.  Labs remarkable for a sodium of 149, chloride 115, BUN 33.2, lactic acid 3.3, WBC 8.  Urine culture from 12/24 greater than 100,000 colonies of Proteus mirabilis, sensitive to ceftriaxone. CT Head increased CSF space of the left as compared to the right minimally much may represent a chronic subdural collection         Pt seen and examined. She is drowsy and disoriented. Son at bedside, states this is not her baseline mental status. She can normally carry a coversation with you, no hx of dementia. He also wants her moved from the nursing home she is at to [facility] in [place] as he is not pleased with the care that has been shown to his mother.     Interval Hx:   Still confused but improved. Was able to tell nurse that she comes from [facility] and [place] Just removed [facility]    Objective/physical exam:  General: drowsy, confused but improved    HEENT: NC/AT    Neck:  No JVD    Chest: CTABL    CVS: Regular rhythm. Normal S1/S2.    Abdomen: nondistended, normoactive BS, soft and non-tender.    MSK: No obvious deformity or joint swelling, diffuse muscle atrophy to BLE    Skin: Warm and dry, sacral decubitus (see chart for pics)    Neuro:  disoriented, moves all extremities    Psych: Cooperative    VITAL SIGNS: 24 HRS MIN & MAX LAST    Temp  Min: 97.3 °F (36.3 °C)  Max: 98.4 °F (36.9 °C) 98.4 °F (36.9 °C)   BP  Min: 100/51  Max: 177/75 (!) 161/80     Pulse  Min: 61  Max: 91  71   Resp  Min: 12  Max: 27 17   SpO2  Min: 96 %  Max: 100 % 97 %       Recent Labs   Lab 12/22/22 0446 12/27/22 2158 12/28/22 0348   WBC 7.6 8.0 10.5   RBC 4.26 4.40 3.61*   HGB 13.2 13.6 11.2*   HCT 42.7 43.7 36.2*   .2* 99.3* 100.3*   MCH 31.0 30.9 31.0   MCHC 30.9* 31.1* 30.9*   RDW 12.7 12.8 12.8    274 232   MPV 9.6 9.6 9.7       Recent Labs   Lab 12/22/22 0446 12/27/22 2159 12/28/22 0348    149* 147*   K 4.0 4.3 4.4   CO2 27 25 22*   BUN 45.1* 33.2* 30.1*   CREATININE 0.80 0.80 0.72   CALCIUM 9.1 9.5 8.2*   MG  --  2.60 2.30   ALBUMIN 3.3* 3.4  --    ALKPHOS 59 81  --    ALT 6 <5  --    AST 14 19  --    BILITOT 0.6 0.5  --           Microbiology Results (last 7 days)       Procedure Component Value Units Date/Time    Urine culture [[id number]] Collected: 12/27/22 2207    Order Status: Sent Specimen: Urine, Catheterized Updated: 12/27/22 2236    Blood culture #2 **CANNOT BE ORDERED STAT** [[id number]] Collected: 12/27/22 2158    Order Status: Resulted Specimen: Blood Updated: 12/27/22 2218    Blood culture #1 **CANNOT BE ORDERED STAT** [[id number]] Collected: 12/27/22 2158    Order Status: Resulted Specimen: Blood Updated: 12/27/22 2218             See below for Radiology    Scheduled Med:   carbidopa-levodopa  mg  2 tablet Oral TID    cefTRIAXone (ROCEPHIN) IVPB  1 g Intravenous Q24H    enoxaparin  40 mg Subcutaneous Daily        Continuous Infusions:   sodium chloride 0.45% 100 mL/hr at 12/28/22 0144        PRN Meds:  acetaminophen, acetaminophen, aluminum-magnesium hydroxide-simethicone, dextrose 10%, dextrose 10%, glucagon (human recombinant), insulin aspart U-100, melatonin, polyethylene glycol, prochlorperazine, senna-docusate 8.6-50 mg, simethicone, sodium chloride 0.9%       Assessment/Plan:    Acute Metabolic  Encephalopathy      Proteus Mirabilis UTI (POA)      Hypovolemic Hypernatremia/dehydration      QT Prolongation      Increase CSF L>R, may represent chronic subdural collection/hygroma  -mri brai w/o contrast w no acute abnormalities      Oropharyngeal Dysphagia  -Minced & Moist Diet - IDDSI Level 5, Liquid Diet Level: Thin liquids - IDDSI Level 0     Stage II Sacral Decubitus (chronic)      Parkinson's Disease       PLAN:    - BCX2, UC from 12/24 proteus mirabilis     - IV Rocephin 1 gm Q24H    - clinimix w/o electrolytes    - Telemetry. Avoid QT Prolonging agents. Check Mag, Phos    - Home meds reviewed.      VTE prophylaxis: Lovenox    Patient condition:  Stable    Anticipated discharge and Disposition:         All diagnosis and differential diagnosis have been reviewed; assessment and plan has been documented; I have personally reviewed the labs and test results that are presently available; I have reviewed the patients medication list; I have reviewed the consulting providers response and recommendations. I have reviewed or attempted to review medical records based upon their availability    All of the patient's questions have been  addressed and answered. Patient's is agreeable to the above stated plan. I will continue to monitor closely and make adjustments to medical management as needed.  _____________________________________________________________________    Nutrition Status:    Radiology:  MRI Brain Without Contrast  Narrative: EXAMINATION:  MRI BRAIN WITHOUT CONTRAST    CLINICAL HISTORY:  Transient ischemic attack (TIA);Stroke, follow up;    TECHNIQUE:  Multiplanar, multisequence MR images of the brain were obtained without the administration of intravenous contrast.    COMPARISON:  CT head dated 12/27/2022    FINDINGS:  There is no restricted diffusion, hemorrhage or edema. Mild scattered T2/FLAIR hyperintensities in the subcortical and periventricular white matter likely represent chronic  microvascular ischemic changes, with prior lacunar infarct in the left thalamus and carlie.    There is no mass effect or midline shift.  The basal cisterns are patent. There is moderate diffuse parenchymal volume loss. There is no hydrocephalus or abnormal extra-axial fluid collection.  The major intracranial flow voids are patent. The paranasal sinuses are clear. There is a small left mastoid effusion.  Impression: 1. No acute intracranial abnormality.  2. Mild chronic microvascular ischemic changes.    Electronically signed by: Nicol Wesley  Date:    12/28/2022  Time:    16:42  X-Ray Chest 1 View  Narrative: EXAMINATION:  XR CHEST 1 VIEW    CLINICAL HISTORY:  altered mental;    TECHNIQUE:  Single view of the chest    COMPARISON:  12/13/2016    FINDINGS:  No focal opacification, pleural effusion, or pneumothorax.    The cardiomediastinal silhouette is within normal limits.    No acute osseous abnormality.  Impression: No acute cardiopulmonary process.    Electronically signed by: George Galicia  Date:    12/28/2022  Time:    07:05      Miguel A Dominguez MD   12/28/2022

## 2022-12-30 LAB
ANION GAP SERPL CALC-SCNC: 7 MEQ/L
BASOPHILS # BLD AUTO: 0.03 X10(3)/MCL (ref 0–0.2)
BASOPHILS NFR BLD AUTO: 0.6 %
BUN SERPL-MCNC: 19.1 MG/DL (ref 9.8–20.1)
CALCIUM SERPL-MCNC: 8.1 MG/DL (ref 8.4–10.2)
CHLORIDE SERPL-SCNC: 109 MMOL/L (ref 98–111)
CO2 SERPL-SCNC: 20 MMOL/L (ref 23–31)
CREAT SERPL-MCNC: 0.68 MG/DL (ref 0.55–1.02)
CREAT/UREA NIT SERPL: 28
EOSINOPHIL # BLD AUTO: 0.07 X10(3)/MCL (ref 0–0.9)
EOSINOPHIL NFR BLD AUTO: 1.5 %
ERYTHROCYTE [DISTWIDTH] IN BLOOD BY AUTOMATED COUNT: 12.6 % (ref 11–14.5)
GFR SERPLBLD CREATININE-BSD FMLA CKD-EPI: >60 MLS/MIN/1.73/M2
GLUCOSE SERPL-MCNC: 175 MG/DL (ref 75–121)
HCT VFR BLD AUTO: 35.7 % (ref 37–47)
HGB BLD-MCNC: 11.7 GM/DL (ref 12–16)
IMM GRANULOCYTES # BLD AUTO: 0.01 X10(3)/MCL (ref 0–0.04)
IMM GRANULOCYTES NFR BLD AUTO: 0.2 %
LYMPHOCYTES # BLD AUTO: 1.93 X10(3)/MCL (ref 0.6–4.6)
LYMPHOCYTES NFR BLD AUTO: 41.5 %
MCH RBC QN AUTO: 30.8 PG
MCHC RBC AUTO-ENTMCNC: 32.8 MG/DL (ref 33–36)
MCV RBC AUTO: 93.9 FL (ref 80–94)
MONOCYTES # BLD AUTO: 0.37 X10(3)/MCL (ref 0.1–1.3)
MONOCYTES NFR BLD AUTO: 8 %
NEUTROPHILS # BLD AUTO: 2.24 X10(3)/MCL (ref 2.1–9.2)
NEUTROPHILS NFR BLD AUTO: 48.2 %
NRBC BLD AUTO-RTO: 0 % (ref 0–1)
PLATELET # BLD AUTO: 233 X10(3)/MCL (ref 140–371)
PMV BLD AUTO: 10.1 FL (ref 9.4–12.4)
POCT GLUCOSE: 141 MG/DL (ref 70–110)
POCT GLUCOSE: 164 MG/DL (ref 70–110)
POCT GLUCOSE: 178 MG/DL (ref 70–110)
POCT GLUCOSE: 185 MG/DL (ref 70–110)
POTASSIUM SERPL-SCNC: 4 MMOL/L (ref 3.5–5.1)
RBC # BLD AUTO: 3.8 X10(6)/MCL (ref 4.2–5.4)
SODIUM SERPL-SCNC: 136 MMOL/L (ref 132–146)
WBC # SPEC AUTO: 4.7 X10(3)/MCL (ref 4.5–11.5)

## 2022-12-30 PROCEDURE — 36415 COLL VENOUS BLD VENIPUNCTURE: CPT | Performed by: INTERNAL MEDICINE

## 2022-12-30 PROCEDURE — 21400001 HC TELEMETRY ROOM

## 2022-12-30 PROCEDURE — 63600175 PHARM REV CODE 636 W HCPCS: Performed by: INTERNAL MEDICINE

## 2022-12-30 PROCEDURE — 85025 COMPLETE CBC W/AUTO DIFF WBC: CPT | Performed by: INTERNAL MEDICINE

## 2022-12-30 PROCEDURE — 25000003 PHARM REV CODE 250: Performed by: INTERNAL MEDICINE

## 2022-12-30 PROCEDURE — 25000003 PHARM REV CODE 250: Performed by: NURSE PRACTITIONER

## 2022-12-30 PROCEDURE — 80048 BASIC METABOLIC PNL TOTAL CA: CPT | Performed by: INTERNAL MEDICINE

## 2022-12-30 RX ADMIN — INSULIN ASPART 2 UNITS: 100 INJECTION, SOLUTION INTRAVENOUS; SUBCUTANEOUS at 06:12

## 2022-12-30 RX ADMIN — ENOXAPARIN SODIUM 40 MG: 40 INJECTION SUBCUTANEOUS at 04:12

## 2022-12-30 RX ADMIN — Medication 6 MG: at 08:12

## 2022-12-30 RX ADMIN — CARBIDOPA AND LEVODOPA 2 TABLET: 10; 100 TABLET ORAL at 03:12

## 2022-12-30 RX ADMIN — CEFTRIAXONE SODIUM 1 G: 1 INJECTION, POWDER, FOR SOLUTION INTRAMUSCULAR; INTRAVENOUS at 05:12

## 2022-12-30 RX ADMIN — CARBIDOPA AND LEVODOPA 2 TABLET: 10; 100 TABLET ORAL at 08:12

## 2022-12-30 NOTE — CARE UPDATE
[id number] Rec call from enrike Abdalla's son who requested I send a referral to [facility]. Referral sent to [facility] thru care port

## 2022-12-30 NOTE — CARE UPDATE
[id number] Rec call from enrike Abdalla's son who requested I send a referral to [facility] and [facility]. Sent referrals thru care port. Notified POP Jeronimo re all 3 referrals sent.

## 2022-12-30 NOTE — CARE UPDATE
[id number] Rec call from Rm who requested I send referral to [facility]. Referral sent thru care port. Notified POP fong

## 2022-12-30 NOTE — CARE UPDATE
[id number] Spoke with Rm, son and informed him that [facility] can no longer except pt due to that they can not meet pt's needs. He will look for another nursing home today and let me know.

## 2022-12-30 NOTE — PROGRESS NOTES
[facility] Hospital Medicine Progress Note        Chief Complaint: Inpatient Follow-up for     HPI: 89 yo female with hx DM2, Parkinsons and chronic sacral wound and dysphagia on soft diet presents to the ED with AMS. She was dx with UTI on Sunday 12/25 and started on Ampicillin but mentation has been worsening.and she is not eating or drinking much. Baseline GCS 14.     Initial ED VS:  T 97.7°, HR 91, R 18, /87, O2 100% RA.  Labs remarkable for a sodium of 149, chloride 115, BUN 33.2, lactic acid 3.3, WBC 8.  Urine culture from 12/24 greater than 100,000 colonies of Proteus mirabilis, sensitive to ceftriaxone. CT Head increased CSF space of the left as compared to the right minimally much may represent a chronic subdural collection MRI of the brain was ordered that shows no hydrocephalus or abnormal extra-axial fluid collection as it was shown in the CT scan there is mild chronic microvascular ischemic changes    Interval Hx:   Patient seen and examined patient's daughter bedside no issues reported overnight patient is very hard of hearing    Objective/physical exam:  General: In no acute distress, afebrile  Chest: Clear to auscultation bilaterally  Heart: RRR, +S1, S2, no appreciable murmur  Abdomen: Soft, nontender, BS +  MSK: Warm, no lower extremity edema, no clubbing or cyanosis  Neurologic: Alert and awake  VITAL SIGNS: 24 HRS MIN & MAX LAST   Temp  Min: 97.4 °F (36.3 °C)  Max: 98.1 °F (36.7 °C) 97.4 °F (36.3 °C)   BP  Min: 113/59  Max: 163/77 (!) 113/59   Pulse  Min: 60  Max: 92  60   Resp  Min: 18  Max: 18 18   SpO2  Min: 96 %  Max: 100 % 98 %       Recent Labs   Lab 12/28/22  0348 12/29/22  0500 12/30/22  0400   WBC 10.5 5.6 4.7   RBC 3.61* 3.46* 3.80*   HGB 11.2* 10.9* 11.7*   HCT 36.2* 34.1* 35.7*   .3* 98.6* 93.9   MCH 31.0 31.5 30.8   MCHC 30.9* 32.0* 32.8*   RDW 12.8 12.7 12.6    223 233   MPV 9.7 10.0 10.1       Recent Labs   Lab 12/27/22  4247  12/28/22  0348 12/29/22  0500 12/30/22  0400   * 147* 137 136   K 4.3 4.4 4.2 4.0   CO2 25 22* 21* 20*   BUN 33.2* 30.1* 19.4 19.1   CREATININE 0.80 0.72 0.65 0.68   CALCIUM 9.5 8.2* 8.0* 8.1*   MG 2.60 2.30 2.20  --    ALBUMIN 3.4  --   --   --    ALKPHOS 81  --   --   --    ALT <5  --   --   --    AST 19  --   --   --    BILITOT 0.5  --   --   --           Microbiology Results (last 7 days)       Procedure Component Value Units Date/Time    Blood culture #1 **CANNOT BE ORDERED STAT** [[id number]]  (Normal) Collected: 12/27/22 2158    Order Status: Completed Specimen: Blood Updated: 12/29/22 2300     CULTURE, BLOOD ([facility]) No Growth At 48 Hours    Blood culture #2 **CANNOT BE ORDERED STAT** [[id number]]  (Normal) Collected: 12/27/22 2158    Order Status: Completed Specimen: Blood Updated: 12/29/22 2300     CULTURE, BLOOD ([facility]) No Growth At 48 Hours    Urine culture [[id number]]  (Abnormal) Collected: 12/27/22 2207    Order Status: Completed Specimen: Urine, Catheterized Updated: 12/29/22 1201     Urine Culture Less than 10,000 colonies/ml Proteus mirabilis     Comment: [place] counts <10,000/ml are of questionable significance and may or may not indicate contamination.  Therefore organisms are identified only.  If further workup is desired please notify Microbiology                 See below for Radiology    Scheduled Med:   carbidopa-levodopa  mg  2 tablet Oral TID    cefTRIAXone (ROCEPHIN) IVPB  1 g Intravenous Q24H    enoxaparin  40 mg Subcutaneous Daily        Continuous Infusions:         PRN Meds:  acetaminophen, acetaminophen, aluminum-magnesium hydroxide-simethicone, dextrose 10%, dextrose 10%, glucagon (human recombinant), insulin aspart U-100, melatonin, polyethylene glycol, prochlorperazine, senna-docusate 8.6-50 mg, simethicone, sodium chloride 0.9%       Assessment/Plan:  Acute metabolic encephalopathy   Proteus UTI present on admission   Hypernatremia   QT prolongation   Oropharyngeal dysphagia    Stage II sacral decubitus ulcer  Parkinson's       [facility] cannot accept the patient as they cannot meet patient's needs so case management will start working on new nursing home placement   Continue with Rocephin symptomatically patient is improving  Patient had MRI of the brain done that showed no extra-axial fluid collection  Speech was consulted but apparently there is no difficulty regarding diet tolerance so speech has signed off   Continue with other home medications that includes levodopa and carbidopa   Patient is on Clinimix at 65 cc an hour will DC after the current bag    VTE prophylaxis:     Patient condition:  Stable/Fair/Guarded/ Serious/ Critical    Anticipated discharge and Disposition:         All diagnosis and differential diagnosis have been reviewed; assessment and plan has been documented; I have personally reviewed the labs and test results that are presently available; I have reviewed the patients medication list; I have reviewed the consulting providers response and recommendations. I have reviewed or attempted to review medical records based upon their availability    All of the patient's questions have been  addressed and answered. Patient's is agreeable to the above stated plan. I will continue to monitor closely and make adjustments to medical management as needed.  _____________________________________________________________________    Nutrition Status:    Radiology:  MRI Brain Without Contrast  Narrative: EXAMINATION:  MRI BRAIN WITHOUT CONTRAST    CLINICAL HISTORY:  Transient ischemic attack (TIA);Stroke, follow up;    TECHNIQUE:  Multiplanar, multisequence MR images of the brain were obtained without the administration of intravenous contrast.    COMPARISON:  CT head dated 12/27/2022    FINDINGS:  There is no restricted diffusion, hemorrhage or edema. Mild scattered T2/FLAIR hyperintensities in the subcortical and periventricular white matter likely represent chronic  microvascular ischemic changes, with prior lacunar infarct in the left thalamus and carlie.    There is no mass effect or midline shift.  The basal cisterns are patent. There is moderate diffuse parenchymal volume loss. There is no hydrocephalus or abnormal extra-axial fluid collection.  The major intracranial flow voids are patent. The paranasal sinuses are clear. There is a small left mastoid effusion.  Impression: 1. No acute intracranial abnormality.  2. Mild chronic microvascular ischemic changes.    Electronically signed by: Nicol Wesley  Date:    12/28/2022  Time:    16:42  X-Ray Chest 1 View  Narrative: EXAMINATION:  XR CHEST 1 VIEW    CLINICAL HISTORY:  altered mental;    TECHNIQUE:  Single view of the chest    COMPARISON:  12/13/2016    FINDINGS:  No focal opacification, pleural effusion, or pneumothorax.    The cardiomediastinal silhouette is within normal limits.    No acute osseous abnormality.  Impression: No acute cardiopulmonary process.    Electronically signed by: George Galicia  Date:    12/28/2022  Time:    07:05      Gema Nina MD   12/30/2022

## 2022-12-30 NOTE — PLAN OF CARE
Spoke with Zofia at [facility] to let her know that pt is medically ready for discharge. She stated that she would reach out to the patient's son to fill out paperwork and reach out to the [facility] to get some necessary paperwork. She will let me know once she gets everything lined up to accept the patient. She is hoping to be ready to accept the patient today.

## 2022-12-30 NOTE — CARE UPDATE
[id number] Rec call from Kandace who reports [facility] is now unable to accept pt since they can not meet pt's needs. Will inform pt's son

## 2022-12-30 NOTE — PROGRESS NOTES
[facility] Neuro  Wound Care    Patient Name:  Mely Abdullahi   MRN:  [id number]  Date: 12/29/2022  Diagnosis: UTI (urinary tract infection)    History:     History reviewed. No pertinent past medical history.    Social History     Socioeconomic History    Marital status: Single       Precautions:     Allergies as of 12/27/2022 - Reviewed 12/27/2022   Allergen Reaction Noted    Seroquel [quetiapine]  12/27/2022       WOC Assessment Details/Treatment        12/29/22 1052        Altered Skin Integrity 12/27/22 2145 posterior Sacral spine #1 Other (comment)   Date First Assessed/Time First Assessed: 12/27/22 2145   Altered Skin Integrity Present on Admission: yes  Orientation: posterior  Location: Sacral spine  Wound Number: #1  Is this injury device related?: No  Primary Wound Type: (c) Other (comment)   Wound Image    Description of Altered Skin Integrity Purple or maroon localized area of discolored intact skin or non-intact skin or a blood-filled blister.   Dressing Appearance Dry;Intact;Clean   Drainage Amount Small   Drainage Characteristics/Odor Serosanguineous   Appearance Pink;Moist;Purple;Red   Red (%), Wound Tissue Color 100 %   Periwound Area Redness;Dry   Wound Edges Defined   Wound Length (cm) 4 cm   Wound Width (cm) 3 cm   Wound Surface Area (cm^2) 12 cm^2   Care Cleansed with:;Sterile normal saline   Dressing Applied;Hydrocolloid        Altered Skin Integrity 12/28/22 1102 Left Heel   Date First Assessed/Time First Assessed: 12/28/22 1102   Side: Left  Location: Heel   Wound Image    Description of Altered Skin Integrity   (blanchable redness)   Dressing Appearance Open to air   Drainage Amount None   Off Loading   (elevate)        Altered Skin Integrity 12/28/22 1102 Right Heel   Date First Assessed/Time First Assessed: 12/28/22 1102   Side: Right  Location: Heel   Wound Image    Description of Altered Skin Integrity   (blanchable redness)   Dressing Appearance Open to air   Off  Loading   (elevate heels)     WOCN consulted for sacrum and bilateral heels. Treatment recommendations for Sacral: Cleanse with NS. Apply hydrocolloid. Change every other day and PRN, float heels. Nursing to continue with turning every two hours, wedge and floating heels. No adult briefs while in bed. JOSE ELIAS mattress and heels lift boots ordered. Will follow up.   12/29/2022

## 2022-12-31 LAB
POCT GLUCOSE: 114 MG/DL (ref 70–110)
POCT GLUCOSE: 169 MG/DL (ref 70–110)
POCT GLUCOSE: 192 MG/DL (ref 70–110)

## 2022-12-31 PROCEDURE — 25000003 PHARM REV CODE 250: Performed by: NURSE PRACTITIONER

## 2022-12-31 PROCEDURE — 25000003 PHARM REV CODE 250: Performed by: INTERNAL MEDICINE

## 2022-12-31 PROCEDURE — 21400001 HC TELEMETRY ROOM

## 2022-12-31 PROCEDURE — 63600175 PHARM REV CODE 636 W HCPCS: Performed by: INTERNAL MEDICINE

## 2022-12-31 RX ORDER — CEFDINIR 300 MG/1
300 CAPSULE ORAL EVERY 12 HOURS
Status: COMPLETED | OUTPATIENT
Start: 2022-12-31 | End: 2023-01-01

## 2022-12-31 RX ADMIN — CEFDINIR 300 MG: 300 CAPSULE ORAL at 09:12

## 2022-12-31 RX ADMIN — CEFTRIAXONE SODIUM 1 G: 1 INJECTION, POWDER, FOR SOLUTION INTRAMUSCULAR; INTRAVENOUS at 05:12

## 2022-12-31 RX ADMIN — ENOXAPARIN SODIUM 40 MG: 40 INJECTION SUBCUTANEOUS at 04:12

## 2022-12-31 RX ADMIN — CEFDINIR 300 MG: 300 CAPSULE ORAL at 11:12

## 2022-12-31 RX ADMIN — CARBIDOPA AND LEVODOPA 2 TABLET: 10; 100 TABLET ORAL at 03:12

## 2022-12-31 RX ADMIN — INSULIN ASPART 2 UNITS: 100 INJECTION, SOLUTION INTRAVENOUS; SUBCUTANEOUS at 11:12

## 2022-12-31 RX ADMIN — CARBIDOPA AND LEVODOPA 2 TABLET: 10; 100 TABLET ORAL at 11:12

## 2022-12-31 NOTE — PLAN OF CARE
Problem: Adult Inpatient Plan of Care  Goal: Plan of Care Review  Outcome: Ongoing, Progressing  Flowsheets (Taken 12/31/2022 0757)  Plan of Care Reviewed With: patient  Goal: Absence of Hospital-Acquired Illness or Injury  Outcome: Ongoing, Progressing  Intervention: Prevent Skin Injury  Flowsheets (Taken 12/31/2022 0757)  Body Position: weight shifting  Intervention: Prevent and Manage VTE (Venous Thromboembolism) Risk  Flowsheets (Taken 12/31/2022 0757)  Activity Management: Rolling - L1  Intervention: Prevent Infection  Flowsheets (Taken 12/31/2022 0757)  Infection Prevention:   rest/sleep promoted   single patient room provided  Goal: Optimal Comfort and Wellbeing  Outcome: Ongoing, Progressing  Intervention: Monitor Pain and Promote Comfort  Flowsheets (Taken 12/31/2022 0757)  Pain Management Interventions:   relaxation techniques promoted   quiet environment facilitated   position adjusted  Intervention: Provide Person-Centered Care  Flowsheets (Taken 12/31/2022 0757)  Trust Relationship/Rapport:   care explained   choices provided   emotional support provided   questions answered   questions encouraged   thoughts/feelings acknowledged  Goal: Readiness for Transition of Care  Outcome: Ongoing, Progressing     Problem: Fall Injury Risk  Goal: Absence of Fall and Fall-Related Injury  Outcome: Ongoing, Progressing  Intervention: Identify and Manage Contributors  Flowsheets (Taken 12/31/2022 0757)  Self-Care Promotion:   safe use of adaptive equipment encouraged   meal set-up provided  Intervention: Promote Injury-Free Environment  Flowsheets (Taken 12/31/2022 0757)  Safety Promotion/Fall Prevention: assistive device/personal item within reach

## 2022-12-31 NOTE — PROGRESS NOTES
[facility] Hospital Medicine Progress Note        Chief Complaint: Inpatient Follow-up for     HPI: 89 yo female with hx DM2, Parkinsons and chronic sacral wound and dysphagia on soft diet presents to the ED with AMS. She was dx with UTI on Sunday 12/25 and started on Ampicillin but mentation has been worsening.and she is not eating or drinking much. Baseline GCS 14.     Initial ED VS:  T 97.7°, HR 91, R 18, /87, O2 100% RA.  Labs remarkable for a sodium of 149, chloride 115, BUN 33.2, lactic acid 3.3, WBC 8.  Urine culture from 12/24 greater than 100,000 colonies of Proteus mirabilis, sensitive to ceftriaxone. CT Head increased CSF space of the left as compared to the right minimally much may represent a chronic subdural collection MRI of the brain was ordered that shows no hydrocephalus or abnormal extra-axial fluid collection as it was shown in the CT scan there is mild chronic microvascular ischemic changes    Interval Hx:   Patient seen and examined no family member bedside opens her eyes to verbal stimuli no other complaints overnight blood pressure fairly stable    Objective/physical exam:  General: In no acute distress, afebrile  Chest: Clear to auscultation bilaterally  Heart: RRR, +S1, S2, no appreciable murmur  Abdomen: Soft, nontender, BS +  MSK: Warm, no lower extremity edema, no clubbing or cyanosis  Neurologic: Alert and awake  VITAL SIGNS: 24 HRS MIN & MAX LAST   Temp  Min: 97.4 °F (36.3 °C)  Max: 97.9 °F (36.6 °C) 97.7 °F (36.5 °C)   BP  Min: 119/60  Max: 164/76 (!) 147/74   Pulse  Min: 63  Max: 83  63   Resp  Min: 17  Max: 18 18   SpO2  Min: 97 %  Max: 100 % 99 %       Recent Labs   Lab 12/28/22  0348 12/29/22  0500 12/30/22  0400   WBC 10.5 5.6 4.7   RBC 3.61* 3.46* 3.80*   HGB 11.2* 10.9* 11.7*   HCT 36.2* 34.1* 35.7*   .3* 98.6* 93.9   MCH 31.0 31.5 30.8   MCHC 30.9* 32.0* 32.8*   RDW 12.8 12.7 12.6    223 233   MPV 9.7 10.0 10.1       Recent  Labs   Lab 12/27/22 2159 12/28/22  0348 12/29/22  0500 12/30/22  0400   * 147* 137 136   K 4.3 4.4 4.2 4.0   CO2 25 22* 21* 20*   BUN 33.2* 30.1* 19.4 19.1   CREATININE 0.80 0.72 0.65 0.68   CALCIUM 9.5 8.2* 8.0* 8.1*   MG 2.60 2.30 2.20  --    ALBUMIN 3.4  --   --   --    ALKPHOS 81  --   --   --    ALT <5  --   --   --    AST 19  --   --   --    BILITOT 0.5  --   --   --           Microbiology Results (last 7 days)       Procedure Component Value Units Date/Time    Blood culture #1 **CANNOT BE ORDERED STAT** [[id number]]  (Normal) Collected: 12/27/22 2158    Order Status: Completed Specimen: Blood Updated: 12/30/22 2300     CULTURE, BLOOD ([facility]) No Growth At 72 Hours    Blood culture #2 **CANNOT BE ORDERED STAT** [[id number]]  (Normal) Collected: 12/27/22 2158    Order Status: Completed Specimen: Blood Updated: 12/30/22 2300     CULTURE, BLOOD ([facility]) No Growth At 72 Hours    Urine culture [[id number]]  (Abnormal) Collected: 12/27/22 2207    Order Status: Completed Specimen: Urine, Catheterized Updated: 12/29/22 1201     Urine Culture Less than 10,000 colonies/ml Proteus mirabilis     Comment: [place] counts <10,000/ml are of questionable significance and may or may not indicate contamination.  Therefore organisms are identified only.  If further workup is desired please notify Microbiology                 See below for Radiology    Scheduled Med:   carbidopa-levodopa  mg  2 tablet Oral TID    cefTRIAXone (ROCEPHIN) IVPB  1 g Intravenous Q24H    enoxaparin  40 mg Subcutaneous Daily        Continuous Infusions:         PRN Meds:  acetaminophen, acetaminophen, aluminum-magnesium hydroxide-simethicone, dextrose 10%, dextrose 10%, glucagon (human recombinant), insulin aspart U-100, melatonin, polyethylene glycol, prochlorperazine, senna-docusate 8.6-50 mg, simethicone, sodium chloride 0.9%       Assessment/Plan:  Acute metabolic encephalopathy   Proteus UTI present on admission   Hypernatremia   QT prolongation    Oropharyngeal dysphagia   Stage II sacral decubitus ulcer  Parkinson's       All vitals stable medically stable   Case management is working on nursing home placement  Will switch antibiotics to p.o. Omnicef continue with levodopa carbidopa   Patient had MRI of the brain done that showed no extra-axial fluid collection  Speech was consulted but apparently there is no difficulty regarding diet tolerance so speech has signed off   Continue with other home medications that includes levodopa and carbidopa   Stop parenteral nutrition     VTE prophylaxis:  Lovenox    Patient condition:  Stable/Fair/Guarded/ Serious/ Critical    Anticipated discharge and Disposition:         All diagnosis and differential diagnosis have been reviewed; assessment and plan has been documented; I have personally reviewed the labs and test results that are presently available; I have reviewed the patients medication list; I have reviewed the consulting providers response and recommendations. I have reviewed or attempted to review medical records based upon their availability    All of the patient's questions have been  addressed and answered. Patient's is agreeable to the above stated plan. I will continue to monitor closely and make adjustments to medical management as needed.  _____________________________________________________________________    Nutrition Status:    Radiology:  MRI Brain Without Contrast  Narrative: EXAMINATION:  MRI BRAIN WITHOUT CONTRAST    CLINICAL HISTORY:  Transient ischemic attack (TIA);Stroke, follow up;    TECHNIQUE:  Multiplanar, multisequence MR images of the brain were obtained without the administration of intravenous contrast.    COMPARISON:  CT head dated 12/27/2022    FINDINGS:  There is no restricted diffusion, hemorrhage or edema. Mild scattered T2/FLAIR hyperintensities in the subcortical and periventricular white matter likely represent chronic microvascular ischemic changes, with prior lacunar  infarct in the left thalamus and carlie.    There is no mass effect or midline shift.  The basal cisterns are patent. There is moderate diffuse parenchymal volume loss. There is no hydrocephalus or abnormal extra-axial fluid collection.  The major intracranial flow voids are patent. The paranasal sinuses are clear. There is a small left mastoid effusion.  Impression: 1. No acute intracranial abnormality.  2. Mild chronic microvascular ischemic changes.    Electronically signed by: Nicol Wesley  Date:    12/28/2022  Time:    16:42  X-Ray Chest 1 View  Narrative: EXAMINATION:  XR CHEST 1 VIEW    CLINICAL HISTORY:  altered mental;    TECHNIQUE:  Single view of the chest    COMPARISON:  12/13/2016    FINDINGS:  No focal opacification, pleural effusion, or pneumothorax.    The cardiomediastinal silhouette is within normal limits.    No acute osseous abnormality.  Impression: No acute cardiopulmonary process.    Electronically signed by: George Galicia  Date:    12/28/2022  Time:    07:05      Gema Nina MD   12/31/2022

## 2023-01-01 LAB
BACTERIA BLD CULT: NORMAL
BACTERIA BLD CULT: NORMAL
POCT GLUCOSE: 118 MG/DL (ref 70–110)
POCT GLUCOSE: 120 MG/DL (ref 70–110)
POCT GLUCOSE: 140 MG/DL (ref 70–110)
POCT GLUCOSE: 165 MG/DL (ref 70–110)

## 2023-01-01 PROCEDURE — 21400001 HC TELEMETRY ROOM

## 2023-01-01 PROCEDURE — 25000003 PHARM REV CODE 250: Performed by: NURSE PRACTITIONER

## 2023-01-01 PROCEDURE — 63600175 PHARM REV CODE 636 W HCPCS: Performed by: INTERNAL MEDICINE

## 2023-01-01 PROCEDURE — 25000003 PHARM REV CODE 250: Performed by: INTERNAL MEDICINE

## 2023-01-01 RX ORDER — MEGESTROL ACETATE 40 MG/ML
200 SUSPENSION ORAL DAILY
Status: DISCONTINUED | OUTPATIENT
Start: 2023-01-02 | End: 2023-01-04 | Stop reason: HOSPADM

## 2023-01-01 RX ADMIN — CEFDINIR 300 MG: 300 CAPSULE ORAL at 10:01

## 2023-01-01 RX ADMIN — ENOXAPARIN SODIUM 40 MG: 40 INJECTION SUBCUTANEOUS at 04:01

## 2023-01-01 RX ADMIN — CARBIDOPA AND LEVODOPA 2 TABLET: 10; 100 TABLET ORAL at 10:01

## 2023-01-01 RX ADMIN — CARBIDOPA AND LEVODOPA 2 TABLET: 10; 100 TABLET ORAL at 04:01

## 2023-01-01 NOTE — PLAN OF CARE
Problem: Adult Inpatient Plan of Care  Goal: Plan of Care Review  Outcome: Ongoing, Progressing  Flowsheets (Taken 1/1/2023 0748)  Plan of Care Reviewed With: patient  Goal: Absence of Hospital-Acquired Illness or Injury  Outcome: Ongoing, Progressing  Intervention: Identify and Manage Fall Risk  Flowsheets (Taken 1/1/2023 0748)  Safety Promotion/Fall Prevention:   assistive device/personal item within reach   side rails raised x 2   Fall Risk reviewed with patient/family  Intervention: Prevent Skin Injury  Flowsheets (Taken 1/1/2023 0748)  Body Position: weight shifting  Intervention: Prevent Infection  Flowsheets (Taken 1/1/2023 0748)  Infection Prevention:   hand hygiene promoted   rest/sleep promoted   single patient room provided  Goal: Optimal Comfort and Wellbeing  Outcome: Ongoing, Progressing  Intervention: Monitor Pain and Promote Comfort  Flowsheets (Taken 1/1/2023 0748)  Pain Management Interventions:   quiet environment facilitated   relaxation techniques promoted  Intervention: Provide Person-Centered Care  Flowsheets (Taken 1/1/2023 0748)  Trust Relationship/Rapport:   care explained   choices provided   emotional support provided   empathic listening provided   questions answered   questions encouraged   reassurance provided   thoughts/feelings acknowledged     Problem: Skin Injury Risk Increased  Goal: Skin Health and Integrity  Outcome: Ongoing, Progressing  Intervention: Optimize Skin Protection  Flowsheets (Taken 1/1/2023 0748)  Pressure Reduction Techniques:   frequent weight shift encouraged   rest period provided between sit times   weight shift assistance provided     Problem: Fall Injury Risk  Goal: Absence of Fall and Fall-Related Injury  Outcome: Ongoing, Progressing  Intervention: Promote Injury-Free Environment  Flowsheets (Taken 1/1/2023 0748)  Safety Promotion/Fall Prevention:   assistive device/personal item within reach   side rails raised x 2   Fall Risk reviewed with  patient/family

## 2023-01-01 NOTE — PROGRESS NOTES
[facility] Hospital Medicine Progress Note        Chief Complaint: Inpatient Follow-up for     HPI: 89 yo female with hx DM2, Parkinsons and chronic sacral wound and dysphagia on soft diet presents to the ED with AMS. She was dx with UTI on Sunday 12/25 and started on Ampicillin but mentation has been worsening.and she is not eating or drinking much. Baseline GCS 14.     Initial ED VS:  T 97.7°, HR 91, R 18, /87, O2 100% RA.  Labs remarkable for a sodium of 149, chloride 115, BUN 33.2, lactic acid 3.3, WBC 8.  Urine culture from 12/24 greater than 100,000 colonies of Proteus mirabilis, sensitive to ceftriaxone. CT Head increased CSF space of the left as compared to the right minimally much may represent a chronic subdural collection MRI of the brain was ordered that shows no hydrocephalus or abnormal extra-axial fluid collection as it was shown in the CT scan there is mild chronic microvascular ischemic changes    Interval Hx:   Patient seen and examined this morning patient's daughter and son bedside.  Nursing staff reported that patient's son refused levodopa carbidopa yesterday as he was trying to see if his mother will be better without the medicine this morning patient is more sleepy.  Discussed with patient's son that we can not stop the medications all of a sudden he verbalized understanding    Objective/physical exam:  General: In no acute distress, afebrile  Chest: Clear to auscultation bilaterally  Heart: RRR, +S1, S2, no appreciable murmur  Abdomen: Soft, nontender, BS +  MSK: Warm, no lower extremity edema, no clubbing or cyanosis  Neurologic:  Sleepy just open her eyes to verbal stimuli  VITAL SIGNS: 24 HRS MIN & MAX LAST   Temp  Min: 97.1 °F (36.2 °C)  Max: 97.9 °F (36.6 °C) 97.5 °F (36.4 °C)   BP  Min: 101/63  Max: 137/73 (!) 116/92   Pulse  Min: 62  Max: 75  69   Resp  Min: 18  Max: 18 18   SpO2  Min: 95 %  Max: 100 % 99 %       Recent Labs   Lab 12/28/22  0343  12/29/22  0500 12/30/22  0400   WBC 10.5 5.6 4.7   RBC 3.61* 3.46* 3.80*   HGB 11.2* 10.9* 11.7*   HCT 36.2* 34.1* 35.7*   .3* 98.6* 93.9   MCH 31.0 31.5 30.8   MCHC 30.9* 32.0* 32.8*   RDW 12.8 12.7 12.6    223 233   MPV 9.7 10.0 10.1       Recent Labs   Lab 12/27/22 2159 12/28/22  0348 12/29/22  0500 12/30/22  0400   * 147* 137 136   K 4.3 4.4 4.2 4.0   CO2 25 22* 21* 20*   BUN 33.2* 30.1* 19.4 19.1   CREATININE 0.80 0.72 0.65 0.68   CALCIUM 9.5 8.2* 8.0* 8.1*   MG 2.60 2.30 2.20  --    ALBUMIN 3.4  --   --   --    ALKPHOS 81  --   --   --    ALT <5  --   --   --    AST 19  --   --   --    BILITOT 0.5  --   --   --           Microbiology Results (last 7 days)       Procedure Component Value Units Date/Time    Blood culture #1 **CANNOT BE ORDERED STAT** [[id number]]  (Normal) Collected: 12/27/22 2158    Order Status: Completed Specimen: Blood Updated: 12/31/22 2300     CULTURE, BLOOD ([facility]) No Growth At 96 Hours    Blood culture #2 **CANNOT BE ORDERED STAT** [[id number]]  (Normal) Collected: 12/27/22 2158    Order Status: Completed Specimen: Blood Updated: 12/31/22 2300     CULTURE, BLOOD ([facility]) No Growth At 96 Hours    Urine culture [[id number]]  (Abnormal) Collected: 12/27/22 2207    Order Status: Completed Specimen: Urine, Catheterized Updated: 12/29/22 1201     Urine Culture Less than 10,000 colonies/ml Proteus mirabilis     Comment: [place] counts <10,000/ml are of questionable significance and may or may not indicate contamination.  Therefore organisms are identified only.  If further workup is desired please notify Microbiology                 See below for Radiology    Scheduled Med:   carbidopa-levodopa  mg  2 tablet Oral TID    cefdinir  300 mg Oral Q12H    enoxaparin  40 mg Subcutaneous Daily        Continuous Infusions:         PRN Meds:  acetaminophen, acetaminophen, aluminum-magnesium hydroxide-simethicone, dextrose 10%, dextrose 10%, glucagon (human recombinant), insulin aspart  U-100, melatonin, polyethylene glycol, prochlorperazine, senna-docusate 8.6-50 mg, simethicone, sodium chloride 0.9%       Assessment/Plan:  Acute metabolic encephalopathy   Proteus UTI present on admission   Hypernatremia   QT prolongation   Oropharyngeal dysphagia   Stage II sacral decubitus ulcer  Parkinson's     Discussed with the nursing staff to give levodopa and carbidopa   Continue with antibiotics  All vitals stable medically stable   Case management is working on nursing home placement  Patient had MRI of the brain done that showed no extra-axial fluid collection  Speech was consulted but apparently there is no difficulty regarding diet tolerance so speech has signed off   Continue with other home medications     VTE prophylaxis:  Lovenox    Patient condition:  Stable/Fair/Guarded/ Serious/ Critical    Anticipated discharge and Disposition:         All diagnosis and differential diagnosis have been reviewed; assessment and plan has been documented; I have personally reviewed the labs and test results that are presently available; I have reviewed the patients medication list; I have reviewed the consulting providers response and recommendations. I have reviewed or attempted to review medical records based upon their availability    All of the patient's questions have been  addressed and answered. Patient's is agreeable to the above stated plan. I will continue to monitor closely and make adjustments to medical management as needed.  _____________________________________________________________________    Nutrition Status:    Radiology:  MRI Brain Without Contrast  Narrative: EXAMINATION:  MRI BRAIN WITHOUT CONTRAST    CLINICAL HISTORY:  Transient ischemic attack (TIA);Stroke, follow up;    TECHNIQUE:  Multiplanar, multisequence MR images of the brain were obtained without the administration of intravenous contrast.    COMPARISON:  CT head dated 12/27/2022    FINDINGS:  There is no restricted diffusion,  hemorrhage or edema. Mild scattered T2/FLAIR hyperintensities in the subcortical and periventricular white matter likely represent chronic microvascular ischemic changes, with prior lacunar infarct in the left thalamus and carlie.    There is no mass effect or midline shift.  The basal cisterns are patent. There is moderate diffuse parenchymal volume loss. There is no hydrocephalus or abnormal extra-axial fluid collection.  The major intracranial flow voids are patent. The paranasal sinuses are clear. There is a small left mastoid effusion.  Impression: 1. No acute intracranial abnormality.  2. Mild chronic microvascular ischemic changes.    Electronically signed by: Nicol Wesley  Date:    12/28/2022  Time:    16:42  X-Ray Chest 1 View  Narrative: EXAMINATION:  XR CHEST 1 VIEW    CLINICAL HISTORY:  altered mental;    TECHNIQUE:  Single view of the chest    COMPARISON:  12/13/2016    FINDINGS:  No focal opacification, pleural effusion, or pneumothorax.    The cardiomediastinal silhouette is within normal limits.    No acute osseous abnormality.  Impression: No acute cardiopulmonary process.    Electronically signed by: George Galicia  Date:    12/28/2022  Time:    07:05      Gema Nina MD   01/01/2023

## 2023-01-02 LAB
ANION GAP SERPL CALC-SCNC: 7 MEQ/L
BASOPHILS # BLD AUTO: 0.02 X10(3)/MCL (ref 0–0.2)
BASOPHILS NFR BLD AUTO: 0.4 %
BUN SERPL-MCNC: 19.2 MG/DL (ref 9.8–20.1)
CALCIUM SERPL-MCNC: 8.3 MG/DL (ref 8.4–10.2)
CHLORIDE SERPL-SCNC: 108 MMOL/L (ref 98–111)
CO2 SERPL-SCNC: 23 MMOL/L (ref 23–31)
CREAT SERPL-MCNC: 0.66 MG/DL (ref 0.55–1.02)
CREAT/UREA NIT SERPL: 29
EOSINOPHIL # BLD AUTO: 0.16 X10(3)/MCL (ref 0–0.9)
EOSINOPHIL NFR BLD AUTO: 3 %
ERYTHROCYTE [DISTWIDTH] IN BLOOD BY AUTOMATED COUNT: 13.3 % (ref 11–14.5)
GFR SERPLBLD CREATININE-BSD FMLA CKD-EPI: >60 MLS/MIN/1.73/M2
GLUCOSE SERPL-MCNC: 120 MG/DL (ref 75–121)
HCT VFR BLD AUTO: 35 % (ref 37–47)
HGB BLD-MCNC: 11.3 GM/DL (ref 12–16)
IMM GRANULOCYTES # BLD AUTO: 0.01 X10(3)/MCL (ref 0–0.04)
IMM GRANULOCYTES NFR BLD AUTO: 0.2 %
LYMPHOCYTES # BLD AUTO: 2.55 X10(3)/MCL (ref 0.6–4.6)
LYMPHOCYTES NFR BLD AUTO: 48.5 %
MCH RBC QN AUTO: 30.5 PG
MCHC RBC AUTO-ENTMCNC: 32.3 MG/DL (ref 33–36)
MCV RBC AUTO: 94.3 FL (ref 80–94)
MONOCYTES # BLD AUTO: 0.44 X10(3)/MCL (ref 0.1–1.3)
MONOCYTES NFR BLD AUTO: 8.4 %
NEUTROPHILS # BLD AUTO: 2.08 X10(3)/MCL (ref 2.1–9.2)
NEUTROPHILS NFR BLD AUTO: 39.5 %
NRBC BLD AUTO-RTO: 0 % (ref 0–1)
PLATELET # BLD AUTO: 235 X10(3)/MCL (ref 140–371)
PMV BLD AUTO: 10.2 FL (ref 9.4–12.4)
POCT GLUCOSE: 169 MG/DL (ref 70–110)
POCT GLUCOSE: 196 MG/DL (ref 70–110)
POTASSIUM SERPL-SCNC: 4.2 MMOL/L (ref 3.5–5.1)
RBC # BLD AUTO: 3.71 X10(6)/MCL (ref 4.2–5.4)
SODIUM SERPL-SCNC: 138 MMOL/L (ref 132–146)
WBC # SPEC AUTO: 5.3 X10(3)/MCL (ref 4.5–11.5)

## 2023-01-02 PROCEDURE — 21400001 HC TELEMETRY ROOM

## 2023-01-02 PROCEDURE — 25000003 PHARM REV CODE 250: Performed by: NURSE PRACTITIONER

## 2023-01-02 PROCEDURE — 36415 COLL VENOUS BLD VENIPUNCTURE: CPT | Performed by: INTERNAL MEDICINE

## 2023-01-02 PROCEDURE — 80048 BASIC METABOLIC PNL TOTAL CA: CPT | Performed by: INTERNAL MEDICINE

## 2023-01-02 PROCEDURE — 85025 COMPLETE CBC W/AUTO DIFF WBC: CPT | Performed by: INTERNAL MEDICINE

## 2023-01-02 PROCEDURE — S0179 MEGESTROL 20 MG: HCPCS | Performed by: INTERNAL MEDICINE

## 2023-01-02 PROCEDURE — 63600175 PHARM REV CODE 636 W HCPCS: Performed by: INTERNAL MEDICINE

## 2023-01-02 PROCEDURE — 25000003 PHARM REV CODE 250: Performed by: INTERNAL MEDICINE

## 2023-01-02 RX ADMIN — INSULIN ASPART 2 UNITS: 100 INJECTION, SOLUTION INTRAVENOUS; SUBCUTANEOUS at 05:01

## 2023-01-02 RX ADMIN — CARBIDOPA AND LEVODOPA 2 TABLET: 10; 100 TABLET ORAL at 08:01

## 2023-01-02 RX ADMIN — ENOXAPARIN SODIUM 40 MG: 40 INJECTION SUBCUTANEOUS at 05:01

## 2023-01-02 RX ADMIN — CARBIDOPA AND LEVODOPA 2 TABLET: 10; 100 TABLET ORAL at 03:01

## 2023-01-02 RX ADMIN — INSULIN ASPART 2 UNITS: 100 INJECTION, SOLUTION INTRAVENOUS; SUBCUTANEOUS at 12:01

## 2023-01-02 RX ADMIN — MEGESTROL ACETATE 200 MG: 400 SUSPENSION ORAL at 08:01

## 2023-01-02 NOTE — PROGRESS NOTES
[facility] Neuro  Wound Care    Patient Name:  Mely Abdullahi   MRN:  [id number]  Date: 1/2/2023  Diagnosis: UTI (urinary tract infection)    History:     History reviewed. No pertinent past medical history.    Social History     Socioeconomic History    Marital status: Single       Precautions:     Allergies as of 12/27/2022 - Reviewed 12/27/2022   Allergen Reaction Noted    Seroquel [quetiapine]  12/27/2022       WOC Assessment Details/Treatment        01/02/23 0945        Altered Skin Integrity 12/27/22 2145 posterior Sacral spine #1 Other (comment)   Date First Assessed/Time First Assessed: 12/27/22 2145   Altered Skin Integrity Present on Admission: yes  Orientation: posterior  Location: Sacral spine  Wound Number: #1  Is this injury device related?: No  Primary Wound Type: (c) Other (comment)   Wound Image    Dressing Appearance Intact;Moist drainage   Drainage Amount Small   Drainage Characteristics/Odor Serosanguineous   Appearance Pink;Red;Epithelialization   Red (%), Wound Tissue Color 100 %   Periwound Area Intact   Wound Edges Defined   Wound Length (cm) 0.6 cm   Wound Width (cm) 0.5 cm   Wound Depth (cm) 0.1 cm   Wound Volume (cm^3) 0.03 cm^3   Wound Surface Area (cm^2) 0.3 cm^2   Care Cleansed with:;Sterile normal saline;Applied:   Dressing   (sacral bordered foam dressing)      Follow up wound care for sacrum and heels. Sacral dressing removed, site cleansed with NS, assessed. Measurement noted is of lt and rt buttock together. Improvement noted. Purwick in place.  Applied new sacral bordered foam dressing.  Heels without redness, huy heel protectors on and patient is on JOSE ELIAS mattress.     01/02/2023

## 2023-01-02 NOTE — PROGRESS NOTES
[facility] Hospital Medicine Progress Note        Chief Complaint: Inpatient Follow-up for     HPI: 89 yo female with hx DM2, Parkinsons and chronic sacral wound and dysphagia on soft diet presents to the ED with AMS. She was dx with UTI on Sunday 12/25 and started on Ampicillin but mentation has been worsening.and she is not eating or drinking much. Baseline GCS 14.     Initial ED VS:  T 97.7°, HR 91, R 18, /87, O2 100% RA.  Labs remarkable for a sodium of 149, chloride 115, BUN 33.2, lactic acid 3.3, WBC 8.  Urine culture from 12/24 greater than 100,000 colonies of Proteus mirabilis, sensitive to ceftriaxone. CT Head increased CSF space of the left as compared to the right minimally much may represent a chronic subdural collection MRI of the brain was ordered that shows no hydrocephalus or abnormal extra-axial fluid collection as it was shown in the CT scan there is mild chronic microvascular ischemic changes    Interval Hx:    Patient seen and examined this morning no family member bedside .  Patient is very but opens eyes to verbal Stimuli  Objective/physical exam:  General: In no acute distress, afebrile  Chest: Clear to auscultation bilaterally  Heart: RRR, +S1, S2, no appreciable murmur  Abdomen: Soft, nontender, BS +  MSK: Warm, no lower extremity edema, no clubbing or cyanosis  Neurologic:  Sleepy just open her eyes to verbal stimuli  VITAL SIGNS: 24 HRS MIN & MAX LAST   Temp  Min: 97.2 °F (36.2 °C)  Max: 98.1 °F (36.7 °C) 97.3 °F (36.3 °C)   BP  Min: 109/65  Max: 143/69 (!) 143/69   Pulse  Min: 61  Max: 69  64   Resp  Min: 18  Max: 18 18   SpO2  Min: 94 %  Max: 100 % 100 %       Recent Labs   Lab 12/29/22  0500 12/30/22  0400 01/02/23  0451   WBC 5.6 4.7 5.3   RBC 3.46* 3.80* 3.71*   HGB 10.9* 11.7* 11.3*   HCT 34.1* 35.7* 35.0*   MCV 98.6* 93.9 94.3*   MCH 31.5 30.8 30.5   MCHC 32.0* 32.8* 32.3*   RDW 12.7 12.6 13.3    233 235   MPV 10.0 10.1 10.2       Recent  Labs   Lab 12/27/22 2159 12/28/22  0348 12/29/22  0500 12/30/22  0400 01/02/23  0451   * 147* 137 136 138   K 4.3 4.4 4.2 4.0 4.2   CO2 25 22* 21* 20* 23   BUN 33.2* 30.1* 19.4 19.1 19.2   CREATININE 0.80 0.72 0.65 0.68 0.66   CALCIUM 9.5 8.2* 8.0* 8.1* 8.3*   MG 2.60 2.30 2.20  --   --    ALBUMIN 3.4  --   --   --   --    ALKPHOS 81  --   --   --   --    ALT <5  --   --   --   --    AST 19  --   --   --   --    BILITOT 0.5  --   --   --   --           Microbiology Results (last 7 days)       Procedure Component Value Units Date/Time    Blood culture #1 **CANNOT BE ORDERED STAT** [[id number]]  (Normal) Collected: 12/27/22 2158    Order Status: Completed Specimen: Blood Updated: 01/01/23 2300     CULTURE, BLOOD ([facility]) No Growth at 5 days    Blood culture #2 **CANNOT BE ORDERED STAT** [[id number]]  (Normal) Collected: 12/27/22 2158    Order Status: Completed Specimen: Blood Updated: 01/01/23 2300     CULTURE, BLOOD ([facility]) No Growth at 5 days    Urine culture [[id number]]  (Abnormal) Collected: 12/27/22 2207    Order Status: Completed Specimen: Urine, Catheterized Updated: 12/29/22 1201     Urine Culture Less than 10,000 colonies/ml Proteus mirabilis     Comment: [place] counts <10,000/ml are of questionable significance and may or may not indicate contamination.  Therefore organisms are identified only.  If further workup is desired please notify Microbiology                 See below for Radiology    Scheduled Med:   carbidopa-levodopa  mg  2 tablet Oral TID    enoxaparin  40 mg Subcutaneous Daily    megestroL  200 mg Oral Daily        Continuous Infusions:         PRN Meds:  acetaminophen, acetaminophen, aluminum-magnesium hydroxide-simethicone, dextrose 10%, dextrose 10%, glucagon (human recombinant), insulin aspart U-100, melatonin, polyethylene glycol, prochlorperazine, senna-docusate 8.6-50 mg, simethicone, sodium chloride 0.9%       Assessment/Plan:  Acute metabolic encephalopathy   Proteus UTI present  on admission   Hypernatremia   QT prolongation   Oropharyngeal dysphagia   Stage II sacral decubitus ulcer  Parkinson's     Continue carbidopa and levodopa  Continue with p.o. antibiotics continue with PT and OT  All vitals stable medically stable   Case management is working on nursing home placement  Patient had MRI of the brain done that showed no extra-axial fluid collection  Speech was consulted but apparently there is no difficulty regarding diet tolerance so speech has signed off   Continue with other home medications     VTE prophylaxis:  Lovenox    Patient condition:  Stable/Fair/Guarded/ Serious/ Critical    Anticipated discharge and Disposition:         All diagnosis and differential diagnosis have been reviewed; assessment and plan has been documented; I have personally reviewed the labs and test results that are presently available; I have reviewed the patients medication list; I have reviewed the consulting providers response and recommendations. I have reviewed or attempted to review medical records based upon their availability    All of the patient's questions have been  addressed and answered. Patient's is agreeable to the above stated plan. I will continue to monitor closely and make adjustments to medical management as needed.  _____________________________________________________________________    Nutrition Status:    Radiology:  MRI Brain Without Contrast  Narrative: EXAMINATION:  MRI BRAIN WITHOUT CONTRAST    CLINICAL HISTORY:  Transient ischemic attack (TIA);Stroke, follow up;    TECHNIQUE:  Multiplanar, multisequence MR images of the brain were obtained without the administration of intravenous contrast.    COMPARISON:  CT head dated 12/27/2022    FINDINGS:  There is no restricted diffusion, hemorrhage or edema. Mild scattered T2/FLAIR hyperintensities in the subcortical and periventricular white matter likely represent chronic microvascular ischemic changes, with prior lacunar infarct in  the left thalamus and carlie.    There is no mass effect or midline shift.  The basal cisterns are patent. There is moderate diffuse parenchymal volume loss. There is no hydrocephalus or abnormal extra-axial fluid collection.  The major intracranial flow voids are patent. The paranasal sinuses are clear. There is a small left mastoid effusion.  Impression: 1. No acute intracranial abnormality.  2. Mild chronic microvascular ischemic changes.    Electronically signed by: Nicol Wesley  Date:    12/28/2022  Time:    16:42  X-Ray Chest 1 View  Narrative: EXAMINATION:  XR CHEST 1 VIEW    CLINICAL HISTORY:  altered mental;    TECHNIQUE:  Single view of the chest    COMPARISON:  12/13/2016    FINDINGS:  No focal opacification, pleural effusion, or pneumothorax.    The cardiomediastinal silhouette is within normal limits.    No acute osseous abnormality.  Impression: No acute cardiopulmonary process.    Electronically signed by: George Galicia  Date:    12/28/2022  Time:    07:05      Gema Nina MD   01/02/2023

## 2023-01-03 LAB
POCT GLUCOSE: 152 MG/DL (ref 70–110)
POCT GLUCOSE: 167 MG/DL (ref 70–110)
POCT GLUCOSE: 215 MG/DL (ref 70–110)
SARS-COV-2 RDRP RESP QL NAA+PROBE: NEGATIVE

## 2023-01-03 PROCEDURE — 25000003 PHARM REV CODE 250: Performed by: NURSE PRACTITIONER

## 2023-01-03 PROCEDURE — 63600175 PHARM REV CODE 636 W HCPCS: Performed by: INTERNAL MEDICINE

## 2023-01-03 PROCEDURE — 21400001 HC TELEMETRY ROOM

## 2023-01-03 PROCEDURE — S0179 MEGESTROL 20 MG: HCPCS | Performed by: INTERNAL MEDICINE

## 2023-01-03 PROCEDURE — 25000003 PHARM REV CODE 250: Performed by: INTERNAL MEDICINE

## 2023-01-03 PROCEDURE — 87635 SARS-COV-2 COVID-19 AMP PRB: CPT | Performed by: INTERNAL MEDICINE

## 2023-01-03 RX ADMIN — ENOXAPARIN SODIUM 40 MG: 40 INJECTION SUBCUTANEOUS at 05:01

## 2023-01-03 RX ADMIN — CARBIDOPA AND LEVODOPA 2 TABLET: 10; 100 TABLET ORAL at 02:01

## 2023-01-03 RX ADMIN — INSULIN ASPART 4 UNITS: 100 INJECTION, SOLUTION INTRAVENOUS; SUBCUTANEOUS at 11:01

## 2023-01-03 RX ADMIN — INSULIN ASPART 2 UNITS: 100 INJECTION, SOLUTION INTRAVENOUS; SUBCUTANEOUS at 05:01

## 2023-01-03 RX ADMIN — CARBIDOPA AND LEVODOPA 2 TABLET: 10; 100 TABLET ORAL at 08:01

## 2023-01-03 RX ADMIN — MEGESTROL ACETATE 200 MG: 400 SUSPENSION ORAL at 08:01

## 2023-01-03 NOTE — CARE UPDATE
[id number] Spoke with Justa at [facility] who reports she has a bed for pt today. She needs pass-r and covid test. Spoke with Dyana at [facility] to obtain the pass-r. Provided fax number.  Called Rm, pt's son who reported he spoke with Kiara at [facility] and they may have a bed today. I informed Rm if [facility] does not have a bed, [facility] does have a bed. Left a message for Kiara at [facility]

## 2023-01-03 NOTE — CARE UPDATE
[id number] Rec call from enrike Abdalla's son who requested I send referral to \[facility]\"". Referral sent to \[facility]\"" thr care port. Notified POP Jeronimo

## 2023-01-03 NOTE — PROGRESS NOTES
[facility] Hospital Medicine Progress Note        Chief Complaint: Inpatient Follow-up for     HPI: 89 yo female with hx DM2, Parkinsons and chronic sacral wound and dysphagia on soft diet presents to the ED with AMS. She was dx with UTI on Sunday 12/25 and started on Ampicillin but mentation has been worsening.and she is not eating or drinking much. Baseline GCS 14.     Initial ED VS:  T 97.7°, HR 91, R 18, /87, O2 100% RA.  Labs remarkable for a sodium of 149, chloride 115, BUN 33.2, lactic acid 3.3, WBC 8.  Urine culture from 12/24 greater than 100,000 colonies of Proteus mirabilis, sensitive to ceftriaxone. CT Head increased CSF space of the left as compared to the right minimally much may represent a chronic subdural collection MRI of the brain was ordered that shows no hydrocephalus or abnormal extra-axial fluid collection as it was shown in the CT scan there is mild chronic microvascular ischemic changes    Interval Hx:    Patient seen and examined this morning     Objective/physical exam:  General: In no acute distress, afebrile  Chest: Clear to auscultation bilaterally  Heart: RRR, +S1, S2, no appreciable murmur  Abdomen: Soft, nontender, BS +  MSK: Warm, no lower extremity edema, no clubbing or cyanosis  Neurologic:  Sleepy just open her eyes to verbal stimuli  VITAL SIGNS: 24 HRS MIN & MAX LAST   Temp  Min: 96.5 °F (35.8 °C)  Max: 99 °F (37.2 °C) 97.4 °F (36.3 °C)   BP  Min: 96/57  Max: 131/60 115/63   Pulse  Min: 65  Max: 89  66   Resp  Min: 16  Max: 18 18   SpO2  Min: 96 %  Max: 99 % 99 %       Recent Labs   Lab 12/29/22  0500 12/30/22  0400 01/02/23  0451   WBC 5.6 4.7 5.3   RBC 3.46* 3.80* 3.71*   HGB 10.9* 11.7* 11.3*   HCT 34.1* 35.7* 35.0*   MCV 98.6* 93.9 94.3*   MCH 31.5 30.8 30.5   MCHC 32.0* 32.8* 32.3*   RDW 12.7 12.6 13.3    233 235   MPV 10.0 10.1 10.2       Recent Labs   Lab 12/27/22  2159 12/28/22  0348 12/29/22  0500 12/30/22  0400  01/02/23  0451   * 147* 137 136 138   K 4.3 4.4 4.2 4.0 4.2   CO2 25 22* 21* 20* 23   BUN 33.2* 30.1* 19.4 19.1 19.2   CREATININE 0.80 0.72 0.65 0.68 0.66   CALCIUM 9.5 8.2* 8.0* 8.1* 8.3*   MG 2.60 2.30 2.20  --   --    ALBUMIN 3.4  --   --   --   --    ALKPHOS 81  --   --   --   --    ALT <5  --   --   --   --    AST 19  --   --   --   --    BILITOT 0.5  --   --   --   --           Microbiology Results (last 7 days)       Procedure Component Value Units Date/Time    Blood culture #1 **CANNOT BE ORDERED STAT** [[id number]]  (Normal) Collected: 12/27/22 2158    Order Status: Completed Specimen: Blood Updated: 01/01/23 2300     CULTURE, BLOOD ([facility]) No Growth at 5 days    Blood culture #2 **CANNOT BE ORDERED STAT** [[id number]]  (Normal) Collected: 12/27/22 2158    Order Status: Completed Specimen: Blood Updated: 01/01/23 2300     CULTURE, BLOOD ([facility]) No Growth at 5 days    Urine culture [[id number]]  (Abnormal) Collected: 12/27/22 2207    Order Status: Completed Specimen: Urine, Catheterized Updated: 12/29/22 1201     Urine Culture Less than 10,000 colonies/ml Proteus mirabilis     Comment: [place] counts <10,000/ml are of questionable significance and may or may not indicate contamination.  Therefore organisms are identified only.  If further workup is desired please notify Microbiology                 See below for Radiology    Scheduled Med:   carbidopa-levodopa  mg  2 tablet Oral TID    enoxaparin  40 mg Subcutaneous Daily    megestroL  200 mg Oral Daily        Continuous Infusions:         PRN Meds:  acetaminophen, acetaminophen, aluminum-magnesium hydroxide-simethicone, dextrose 10%, dextrose 10%, glucagon (human recombinant), insulin aspart U-100, melatonin, polyethylene glycol, prochlorperazine, senna-docusate 8.6-50 mg, simethicone, sodium chloride 0.9%       Assessment/Plan:  Acute metabolic encephalopathy   Proteus UTI present on admission   Hypernatremia   QT prolongation   Oropharyngeal dysphagia    Stage II sacral decubitus ulcer  Parkinson's     Completed the treatment for UTI  Sodium is back to normal  Continue carbidopa and levodopa  All vitals stable  Case management is working on nursing home placement  Patient had MRI of the brain done that showed no extra-axial fluid collection  Speech was consulted but apparently there is no difficulty regarding diet tolerance so speech has signed off   Continue with other home medications     VTE prophylaxis:  Lovenox    Patient condition:  Stable/Fair/Guarded/ Serious/ Critical    Anticipated discharge and Disposition:         All diagnosis and differential diagnosis have been reviewed; assessment and plan has been documented; I have personally reviewed the labs and test results that are presently available; I have reviewed the patients medication list; I have reviewed the consulting providers response and recommendations. I have reviewed or attempted to review medical records based upon their availability    All of the patient's questions have been  addressed and answered. Patient's is agreeable to the above stated plan. I will continue to monitor closely and make adjustments to medical management as needed.  _____________________________________________________________________    Nutrition Status:    Radiology:  MRI Brain Without Contrast  Narrative: EXAMINATION:  MRI BRAIN WITHOUT CONTRAST    CLINICAL HISTORY:  Transient ischemic attack (TIA);Stroke, follow up;    TECHNIQUE:  Multiplanar, multisequence MR images of the brain were obtained without the administration of intravenous contrast.    COMPARISON:  CT head dated 12/27/2022    FINDINGS:  There is no restricted diffusion, hemorrhage or edema. Mild scattered T2/FLAIR hyperintensities in the subcortical and periventricular white matter likely represent chronic microvascular ischemic changes, with prior lacunar infarct in the left thalamus and carlie.    There is no mass effect or midline shift.  The basal  cisterns are patent. There is moderate diffuse parenchymal volume loss. There is no hydrocephalus or abnormal extra-axial fluid collection.  The major intracranial flow voids are patent. The paranasal sinuses are clear. There is a small left mastoid effusion.  Impression: 1. No acute intracranial abnormality.  2. Mild chronic microvascular ischemic changes.    Electronically signed by: Nicol Wesley  Date:    12/28/2022  Time:    16:42  X-Ray Chest 1 View  Narrative: EXAMINATION:  XR CHEST 1 VIEW    CLINICAL HISTORY:  altered mental;    TECHNIQUE:  Single view of the chest    COMPARISON:  12/13/2016    FINDINGS:  No focal opacification, pleural effusion, or pneumothorax.    The cardiomediastinal silhouette is within normal limits.    No acute osseous abnormality.  Impression: No acute cardiopulmonary process.    Electronically signed by: George Galicia  Date:    12/28/2022  Time:    07:05      Gema Nina MD   01/03/2023

## 2023-01-03 NOTE — CARE UPDATE
[id number] Spoke w [facility] who reports they will have a bed tomorrow. Need covid and chest xray. She will call [facility] for the pass-r and 142. MD and family are in agreement with dc to [facility] tomorrow

## 2023-01-04 VITALS
HEART RATE: 79 BPM | RESPIRATION RATE: 18 BRPM | TEMPERATURE: 98 F | OXYGEN SATURATION: 98 % | SYSTOLIC BLOOD PRESSURE: 116 MMHG | DIASTOLIC BLOOD PRESSURE: 61 MMHG | WEIGHT: 90 LBS | HEIGHT: 64 IN | BODY MASS INDEX: 15.36 KG/M2

## 2023-01-04 LAB
POCT GLUCOSE: 135 MG/DL (ref 70–110)
POCT GLUCOSE: 156 MG/DL (ref 70–110)

## 2023-01-04 PROCEDURE — S0179 MEGESTROL 20 MG: HCPCS | Performed by: INTERNAL MEDICINE

## 2023-01-04 PROCEDURE — 25000003 PHARM REV CODE 250: Performed by: INTERNAL MEDICINE

## 2023-01-04 PROCEDURE — 25000003 PHARM REV CODE 250: Performed by: NURSE PRACTITIONER

## 2023-01-04 RX ORDER — MEGESTROL ACETATE 40 MG/ML
200 SUSPENSION ORAL DAILY
Qty: 150 ML | Refills: 0 | Status: SHIPPED | OUTPATIENT
Start: 2023-01-04 | End: 2023-02-03

## 2023-01-04 RX ADMIN — CARBIDOPA AND LEVODOPA 2 TABLET: 10; 100 TABLET ORAL at 09:01

## 2023-01-04 RX ADMIN — MEGESTROL ACETATE 200 MG: 400 SUSPENSION ORAL at 09:01

## 2023-01-04 NOTE — PLAN OF CARE
Sent discharge packet to Oanh at [facility] for review. Awaiting who to call report to at this time. MEJIA Gibson stated that she will put pt in will call for transport by [facility].

## 2023-01-04 NOTE — PROGRESS NOTES
Mrs. Abdullahi's son verbalized understanding of all discharge instructions and s/s to be aware of that require immediate medical attention. Report was given to Shanna at [facility]. Opportunity for questions and concerns given. Mrs. Abdullahi is currently in bed, belongings are packed, and she is awaiting pickup via [facility] for transfer.

## 2023-01-04 NOTE — DISCHARGE SUMMARY
[facility] Hospital Medicine Discharge Summary    Admit Date: 12/27/2022  Discharge Date and Time: 1/4/20239:27 AM  Admitting Physician: KAMRAN Team  Discharging Physician: Gema Nina MD.  Primary Care Physician: Primary Doctor No  Consults: None    Discharge Diagnoses:  Acute metabolic encephalopathy   Proteus UTI present on admission   Hypernatremia   QT prolongation   Oropharyngeal dysphagia   Stage II sacral decubitus ulcer  Parkinson's     Hospital Course:   89 yo female with hx DM2, Parkinsons and chronic sacral wound and dysphagia on soft diet presents to the ED with AMS. She was dx with UTI on Sunday 12/25 and started on Ampicillin but mentation has been worsening.and she is not eating or drinking much. Baseline GCS 14.     Initial ED VS:  T 97.7°, HR 91, R 18, /87, O2 100% RA.  Labs remarkable for a sodium of 149, chloride 115, BUN 33.2, lactic acid 3.3, WBC 8.  Urine culture from 12/24 greater than 100,000 colonies of Proteus mirabilis, sensitive to ceftriaxone. CT Head increased CSF space of the left as compared to the right minimally much may represent a chronic subdural collection MRI of the brain was ordered that shows no hydrocephalus or abnormal extra-axial fluid collection as it was shown in the CT scan there is mild chronic microvascular ischemic changes  Urine cultures grew Proteus patient was started on antibiotics as per the sensitivity patient completed treatment sodium was back to normal home medications were continued patient had MRI of the brain done that showed no extra-axial fluid collection.  Speech was consulted but there was no difficulty regarding diet tolerance so speech signed off.  Patient was discharged to nursing home in stable condition  Pt was seen and examined on the day of discharge  Vitals:  VITAL SIGNS: 24 HRS MIN & MAX LAST   Temp  Min: 97.6 °F (36.4 °C)  Max: 98.6 °F (37 °C) 97.6 °F (36.4 °C)   BP  Min: 114/64  Max: 139/75 139/75    Pulse  Min: 63  Max: 78  64   Resp  Min: 18  Max: 18 18   SpO2  Min: 98 %  Max: 99 % 98 %       Physical Exam:  General: In no acute distress, afebrile  Chest: Clear to auscultation bilaterally  Heart: RRR, +S1, S2, no appreciable murmur  Abdomen: Soft, nontender, BS +  MSK: Warm, no lower extremity edema, no clubbing or cyanosis  Neurologic:  Sleepy just open her eyes to verbal stimuli    Procedures Performed: No admission procedures for hospital encounter.     Significant Diagnostic Studies: See Full reports for all details    Recent Labs   Lab 12/29/22  0500 12/30/22  0400 01/02/23  0451   WBC 5.6 4.7 5.3   RBC 3.46* 3.80* 3.71*   HGB 10.9* 11.7* 11.3*   HCT 34.1* 35.7* 35.0*   MCV 98.6* 93.9 94.3*   MCH 31.5 30.8 30.5   MCHC 32.0* 32.8* 32.3*   RDW 12.7 12.6 13.3    233 235   MPV 10.0 10.1 10.2       Recent Labs   Lab 12/29/22  0500 12/30/22  0400 01/02/23  0451    136 138   K 4.2 4.0 4.2   CO2 21* 20* 23   BUN 19.4 19.1 19.2   CREATININE 0.65 0.68 0.66   CALCIUM 8.0* 8.1* 8.3*   MG 2.20  --   --         Microbiology Results (last 7 days)       Procedure Component Value Units Date/Time    Blood culture #1 **CANNOT BE ORDERED STAT** [[id number]]  (Normal) Collected: 12/27/22 2158    Order Status: Completed Specimen: Blood Updated: 01/01/23 2300     CULTURE, BLOOD ([facility]) No Growth at 5 days    Blood culture #2 **CANNOT BE ORDERED STAT** [[id number]]  (Normal) Collected: 12/27/22 2158    Order Status: Completed Specimen: Blood Updated: 01/01/23 2300     CULTURE, BLOOD ([facility]) No Growth at 5 days    Urine culture [[id number]]  (Abnormal) Collected: 12/27/22 2207    Order Status: Completed Specimen: Urine, Catheterized Updated: 12/29/22 1201     Urine Culture Less than 10,000 colonies/ml Proteus mirabilis     Comment: [place] counts <10,000/ml are of questionable significance and may or may not indicate contamination.  Therefore organisms are identified only.  If further workup is desired please notify  Microbiology                 MRI Brain Without Contrast  Narrative: EXAMINATION:  MRI BRAIN WITHOUT CONTRAST    CLINICAL HISTORY:  Transient ischemic attack (TIA);Stroke, follow up;    TECHNIQUE:  Multiplanar, multisequence MR images of the brain were obtained without the administration of intravenous contrast.    COMPARISON:  CT head dated 12/27/2022    FINDINGS:  There is no restricted diffusion, hemorrhage or edema. Mild scattered T2/FLAIR hyperintensities in the subcortical and periventricular white matter likely represent chronic microvascular ischemic changes, with prior lacunar infarct in the left thalamus and carlie.    There is no mass effect or midline shift.  The basal cisterns are patent. There is moderate diffuse parenchymal volume loss. There is no hydrocephalus or abnormal extra-axial fluid collection.  The major intracranial flow voids are patent. The paranasal sinuses are clear. There is a small left mastoid effusion.  Impression: 1. No acute intracranial abnormality.  2. Mild chronic microvascular ischemic changes.    Electronically signed by: Nicol Wesley  Date:    12/28/2022  Time:    16:42  X-Ray Chest 1 View  Narrative: EXAMINATION:  XR CHEST 1 VIEW    CLINICAL HISTORY:  altered mental;    TECHNIQUE:  Single view of the chest    COMPARISON:  12/13/2016    FINDINGS:  No focal opacification, pleural effusion, or pneumothorax.    The cardiomediastinal silhouette is within normal limits.    No acute osseous abnormality.  Impression: No acute cardiopulmonary process.    Electronically signed by: George Galicia  Date:    12/28/2022  Time:    07:05         Medication List        START taking these medications      megestroL 400 mg/10 mL (10 mL) Susp  Commonly known as: MEGACE  Take 5 mLs (200 mg total) by mouth once daily.            CONTINUE taking these medications      carbidopa-levodopa  mg  mg per tablet  Commonly known as: SINEMET     insulin regular 100 unit/mL injection      lactulose 10 gram/15 mL solution  Commonly known as: CHRONULAC     LANTUS SOLOSTAR U-100 INSULIN SUBQ            STOP taking these medications      ampicillin 500 MG capsule  Commonly known as: PRINCIPEN               Where to Get Your Medications        You can get these medications from any pharmacy    Bring a paper prescription for each of these medications  megestroL 400 mg/10 mL (10 mL) Susp          Explained in detail to the patient about the discharge plan, medications, and follow-up visits. Pt understands and agrees with the treatment plan  Discharge Disposition:    Discharged Condition: stable  Diet-   Dietary Orders (From admission, onward)       Start     Ordered    12/31/22 1547  Dietary nutrition supplements Boost Glucose Control Strawberry; BID  Continuous        Question Answer Comment   Select PO Supplement: Boost Glucose Control Strawberry    Frequency: BID        12/31/22 1546    12/28/22 1420  Diet Minced & Moist (1:1 Assist with all meals)  Diet effective now        Comments: Meds crushed in puree   Question:  Diet Modifier:  Answer:    Comment:  1:1 Assist with all meals    12/28/22 1420                   Medications Per DC med rec  Activities as tolerated    For further questions contact hospitalist office    Discharge time 33 minutes    For worsening symptoms, chest pain, shortness of breath, increased abdominal pain, high grade fever, stroke or stroke like symptoms, immediately go to the nearest Emergency Room or call 911 as soon as possible.      Gema Penny M.D, on 1/4/2023. at 9:27 AM.

## 2023-01-04 NOTE — CARE UPDATE
[id number] Placed pt in will call with [facility] ambulance for pt to be transported to [place] [facility] today

## 2023-01-04 NOTE — PROGRESS NOTES
Inpatient Nutrition Assessment    Admit Date: 2022   Total duration of encounter: 7 days     Nutrition Recommendation/Prescription     - continue diet per SLP recs  - continue boost glucose control; provides 240kcal, 14 gm protein  - continue appetite stimulant if medically appropriate    Communication of Recommendations: reviewed with nurse    Nutrition Assessment     Malnutrition Assessment/Nutrition-Focused Physical Exam    Malnutrition in the context of chronic illness  Degree of Malnutrition: unable to complete  Energy Intake: unable to obtain  Interpretation of Weight Loss: unable to obtain  Body Fat: unable to obtain  Area of Body Fat Loss: unable to obtain  Muscle Mass Loss: unable to obtain  Area of Muscle Mass Loss: unable to obtain  Fluid Accumulation: unable to obtain  Edema: not applicable  Reduced  Strength: unable to obtain  A minimum of two characteristics is recommended for diagnosis of either severe or non-severe malnutrition.    Chart Review    Reason Seen: length of stay    Malnutrition Screening Tool Results   Have you recently lost weight without trying?: No  Have you been eating poorly because of a decreased appetite?: No   MST Score: 0     Diagnosis:  Acute metabolic encephalopathy   Proteus UTI present on admission   Hypernatremia   QT prolongation   Oropharyngeal dysphagia   Stage II sacral decubitus ulcer  Parkinson's      Continue carbidopa and levodopa  Continue with p.o. antibiotics     Relevant Medical History:  DM2, Parkinsons and chronic sacral wound and dysphagia on soft diet     Nutrition-Related Medications: megestrol  Calorie Containing IV Medications: no significant kcals from medications at this time    Nutrition-Related Labs:  CB-215    Diet/PN Order: Diet Minced & Moist (1:1 Assist with all meals)  Oral Supplement Order: Boost Glucose Control  Tube Feeding Order: none  Appetite/Oral Intake: poor/25-50% of meals  Factors Affecting Nutritional Intake: decreased  "appetite  Food/Adventist/Cultural Preferences: none reported  Food Allergies: none reported    Skin Integrity: wound  Wound(s):   sacral spine, left heel, right heel    Comments    1/3: pt with poor appetite, receving boost; will attempt phyiscal assessment on follow up; pt not available    Anthropometrics    Height: 5' 4.02" (162.6 cm) Height Method: Stated  Last Weight: 40.8 kg (90 lb) (12/28/22 1534) Weight Method: Standard Scale  BMI (Calculated): 15.4  BMI Classification: underweight (BMI less than 18.5)     Ideal Body Weight (IBW), Female: 120.1 lb     % Ideal Body Weight, Female (lb): 75 %                             Usual Weight Provided By: unable to obtain usual weight    Wt Readings from Last 5 Encounters:   12/28/22 40.8 kg (90 lb)     Weight Change(s) Since Admission:  Admit Weight: 40.8 kg (90 lb) (12/27/22 1942)      Estimated Needs    Weight Used For Calorie Calculations: 40.8 kg (89 lb 15.2 oz)  Energy Calorie Requirements (kcal): 1224kcal (30kcal/kg)  Energy Need Method: Kcal/kg  Weight Used For Protein Calculations: 40.8 kg (89 lb 15.2 oz)  Protein Requirements: 49gm (1.2 gm/kg)  Fluid Requirements (mL): 1224ml (1ml/kcal)  Temp: 97.8 °F (36.6 °C)       Enteral Nutrition    Patient not receiving enteral nutrition at this time.    Parenteral Nutrition    Patient not receiving parenteral nutrition support at this time.    Evaluation of Received Nutrient Intake    Calories: not meeting estimated needs  Protein: not meeting estimated needs    Patient Education    Not applicable.    Nutrition Diagnosis     PES: Inadequate oral intake related to current condition as evidenced by intake of <75% est energy needs. (new)    Interventions/Goals     Intervention(s): collaboration with other providers  Goal: Meet greater than 75% of nutritional needs by follow-up. (new)    Monitoring & Evaluation     Dietitian will monitor food and beverage intake.  Nutrition Risk/Follow-Up: high (follow-up in 1-4 days) "   Please consult if re-assessment needed sooner.

## 2023-04-03 PROBLEM — N39.0 UTI (URINARY TRACT INFECTION): Status: RESOLVED | Noted: 2022-12-28 | Resolved: 2023-04-03
